# Patient Record
Sex: FEMALE | Race: WHITE | Employment: FULL TIME | URBAN - METROPOLITAN AREA
[De-identification: names, ages, dates, MRNs, and addresses within clinical notes are randomized per-mention and may not be internally consistent; named-entity substitution may affect disease eponyms.]

---

## 2022-04-04 ENCOUNTER — HOSPITAL ENCOUNTER (OUTPATIENT)
Age: 32
Discharge: HOME | End: 2022-04-06

## 2022-04-04 PROCEDURE — 88305 TISSUE EXAM BY PATHOLOGIST: CPT

## 2023-01-05 ENCOUNTER — OFFICE VISIT (OUTPATIENT)
Dept: OBGYN | Age: 33
End: 2023-01-05
Payer: COMMERCIAL

## 2023-01-05 VITALS
HEIGHT: 66 IN | DIASTOLIC BLOOD PRESSURE: 85 MMHG | SYSTOLIC BLOOD PRESSURE: 121 MMHG | HEART RATE: 97 BPM | BODY MASS INDEX: 23.18 KG/M2 | WEIGHT: 144.2 LBS

## 2023-01-05 DIAGNOSIS — N92.6 IRREGULAR MENSES: ICD-10-CM

## 2023-01-05 DIAGNOSIS — Z01.419 ENCOUNTER FOR GYNECOLOGICAL EXAMINATION: Primary | ICD-10-CM

## 2023-01-05 PROCEDURE — 99203 OFFICE O/P NEW LOW 30 MIN: CPT | Performed by: OBSTETRICS & GYNECOLOGY

## 2023-01-05 PROCEDURE — 99385 PREV VISIT NEW AGE 18-39: CPT | Performed by: OBSTETRICS & GYNECOLOGY

## 2023-01-05 NOTE — PROGRESS NOTES
Here today as new patient to establish care. LMP 12/26/22. Last pap was with Dr. Nelia Doherty in march of 2022. Denies any concerns at this time. Has never been pregnant. Pap smear obtained, labeled and sent to the lab. Directed to the lab to obtain ordered blood work. Discharge instructions have been discussed with the patient. Patient advised to call our office with any questions or concerns. Voiced understanding.

## 2023-01-05 NOTE — PROGRESS NOTES
HISTORY OF PRESENT ILLNESS:    33 y.o. female  No obstetric history on file. presents for her annual exam.     Patient's last menstrual period was 12/26/2022 (exact date). Periods are: irregular q 4-6 weeks  Contraception: none  Number of new sexual partners: 0  Most recent pap smear: 2022 normal  History of abnormal pap smears: none  Most recent mammogram: April 2021 fibrocystic disease  History of abnormal mammogram: none  Most recent colonoscopy:   Dexa scan:   HPV vaccination: no, recommended to patient  Changes to health since last visit: n/a  Complaints: irregular menses q 4-6 weeks. Stopped ocp in April 2022, would like to conceive      Past Medical History:   Past Medical History:   Diagnosis Date    Fibrocystic breast                                              OB History   No obstetric history on file. Past Surgical History:   Past Surgical History:   Procedure Laterality Date    BREAST BIOPSY          Allergies: Patient has no known allergies. Medications:   Current Outpatient Medications   Medication Sig Dispense Refill    Prenatal Vit-Fe Fumarate-FA (PRENATAL VITAMIN) 27-0.8 MG TABS Take 1 tablet by mouth daily 90 tablet 4     No current facility-administered medications for this visit.          Social History:   Social History     Tobacco Use    Smoking status: Never    Smokeless tobacco: Not on file   Substance Use Topics    Alcohol use: Not on file        Family History:   Family History   Problem Relation Age of Onset    Diabetes Mother     Diabetes Father     Pervasive Developmental Disorders  Maternal Aunt        REVIEW OF SYSTEMS:    Constitutional: negative  HEENT: negative  Breast: negative  Respiratory: negative  Cardiovascular: negative  Gastrointestinal: negative  Genitourinary:irregular menses  Integument: negative  Neurological: negative  Endocrine: negative          PHYSICAL EXAM  /85 (Position: Sitting)   Pulse 97   Ht 5' 6\" (1.676 m)   Wt 144 lb 3.2 oz (65.4 kg) LMP 12/26/2022 (Exact Date)   BMI 23.27 kg/m²       General appearance: alert, cooperative and in no acute distress. Head: NCAT   Neck: Neck supple, no mass  Breasts: nontender, 1 cm mobile mass at 10 o'clock, mobile, no dimpling, no discharge  Lungs: clear to auscultation bilaterally  Heart: regular rate and rhythm, no murmurs  Abdomen: soft, nontender, nondistended, no masses palpable, no rebound tenderness, no guarding or rigidity  Skin: No suspicious lesions  Neurologic: Alert and oriented, no focal deficits  Extremities: symmetrical without clubbing or cynosis  Psych: Alert, oriented, mood/affect full range, no acute distress    Pelvic Exam:   EXTERNAL GENITALIA: normal external genitalia, no external lesions  VAGINA: normal rugae, no discharge   CERVIX: normal appearing, no lesions, no bleeding  UTERUS: uterus is normal size, shape, consistency and nontender   ADNEXA: normal adnexa in size, nontender and no masses. RECTUM: no hemorrhoids or rectal masses. ASSESSMENT : Routine Annual Exam,    Diagnosis Orders   1. Encounter for gynecological examination  PAP SMEAR      2. Irregular menses  PAP SMEAR    Glucose, Random    Luteinizing Hormone    Follicle Stimulating Hormone    CBC    Prolactin    TSH    Testosterone, free, total    Insulin, total    DHEA-Sulfate    HCG Qualitative, Serum    Estradiol    US NON OB TRANSVAGINAL           PLAN:  Discussed ovulation test strips and PNV. Return in about 4 weeks (around 2/2/2023) for Review results. No orders of the defined types were placed in this encounter. Orders Placed This Encounter   Procedures    US NON OB TRANSVAGINAL     Standing Status:   Future     Standing Expiration Date:   1/5/2024    PAP SMEAR     Order Specific Question:   Collection Type     Answer:    Thin Prep     Order Specific Question:   Prior Abnormal Pap Test     Answer:   No     Order Specific Question:   Screening or Diagnostic     Answer:   Screening     Order Specific Question:   Additional STD Testing     Answer:   N/A     Order Specific Question:   HPV Requested?      Answer:   HPV Co-Test     Order Specific Question:   High Risk Patient     Answer:   N/A    Glucose, Random     Standing Status:   Future     Standing Expiration Date:   1/5/2024    Luteinizing Hormone     Standing Status:   Future     Standing Expiration Date:   7/7/6242    Follicle Stimulating Hormone     Standing Status:   Future     Standing Expiration Date:   1/5/2024    CBC     Standing Status:   Future     Standing Expiration Date:   1/5/2024    Prolactin     Standing Status:   Future     Standing Expiration Date:   1/5/2024    TSH     Standing Status:   Future     Standing Expiration Date:   1/5/2024    Testosterone, free, total     Standing Status:   Future     Standing Expiration Date:   1/5/2024    Insulin, total     Standing Status:   Future     Standing Expiration Date:   1/5/2024    DHEA-Sulfate     Standing Status:   Future     Standing Expiration Date:   1/5/2024    HCG Qualitative, Serum     Standing Status:   Future     Standing Expiration Date:   1/5/2024    Estradiol     Standing Status:   Future     Standing Expiration Date:   1/5/2024           Electronically signed by Amandeep Medel DO on 1/5/23

## 2023-01-06 LAB
HPV SAMPLE: NORMAL
SOURCE: NORMAL

## 2023-01-27 ENCOUNTER — HOSPITAL ENCOUNTER (OUTPATIENT)
Dept: ULTRASOUND IMAGING | Age: 33
Discharge: HOME | End: 2023-01-27
Payer: COMMERCIAL

## 2023-01-27 DIAGNOSIS — N92.6 IRREGULAR MENSES: ICD-10-CM

## 2023-01-27 PROCEDURE — 76830 TRANSVAGINAL US NON-OB: CPT

## 2023-02-02 ENCOUNTER — OFFICE VISIT (OUTPATIENT)
Dept: OBGYN | Age: 33
End: 2023-02-02
Payer: COMMERCIAL

## 2023-02-02 VITALS
SYSTOLIC BLOOD PRESSURE: 119 MMHG | BODY MASS INDEX: 24.1 KG/M2 | WEIGHT: 149.3 LBS | HEART RATE: 90 BPM | DIASTOLIC BLOOD PRESSURE: 76 MMHG

## 2023-02-02 DIAGNOSIS — N92.6 IRREGULAR MENSES: Primary | ICD-10-CM

## 2023-02-02 PROCEDURE — 99211 OFF/OP EST MAY X REQ PHY/QHP: CPT

## 2023-02-02 PROCEDURE — 99213 OFFICE O/P EST LOW 20 MIN: CPT | Performed by: OBSTETRICS & GYNECOLOGY

## 2023-02-02 NOTE — PROGRESS NOTES
Here today to review pap results and labs from 1/5/23. Directed to the lab to obtain ordered blood work. Discharge instructions have been discussed with the patient. Patient advised to call our office with any questions or concerns. Voiced understanding.

## 2023-02-02 NOTE — PROGRESS NOTES
HISTORY OF PRESENT ILLNESS:    Pt presents for follow up for irregular menses q 4-6 weeks. Stopped ocp in April 2022, would like to conceive. US was normal. Pt did not get labs drawn yet. Has been doing ovulation test strips, positive on day 24. Past Medical History:   Past Medical History:   Diagnosis Date    Fibroadenoma                                              OB History   No obstetric history on file. Past Surgical History:   Past Surgical History:   Procedure Laterality Date    BREAST BIOPSY          Allergies: Patient has no known allergies. Medications:   Current Outpatient Medications   Medication Sig Dispense Refill    Prenatal Vit-Fe Fumarate-FA (PRENATAL VITAMIN) 27-0.8 MG TABS Take 1 tablet by mouth daily 90 tablet 4     No current facility-administered medications for this visit. Social History:   Social History     Tobacco Use    Smoking status: Never    Smokeless tobacco: Not on file   Substance Use Topics    Alcohol use: Not on file        Family History:   Family History   Problem Relation Age of Onset    Diabetes Mother     Diabetes Father     Pervasive Developmental Disorders  Maternal Aunt        REVIEW OF SYSTEMS:    Constitutional: negative  HEENT: negative  Breast: negative  Respiratory: negative  Cardiovascular: negative  Gastrointestinal: negative  Genitourinary:negative  Integument: negative  Neurological: negative  Endocrine: negative          PHYSICAL EXAM  /76 (Position: Sitting)   Pulse 90   Wt 149 lb 4.8 oz (67.7 kg)   LMP 02/01/2023   BMI 24.10 kg/m²   Patient's last menstrual period was 02/01/2023. General appearance: alert, cooperative and in no acute distress. Head: NCAT   Psych: No acute distress, mood and affect full range  Neuro: Alert and oriented, no focal deficits          ASSESSMENT :   Diagnosis Orders   1.  Irregular menses  Miscellaneous Sendout           PLAN:  Discussed 80% of couples will conceive in first year of trying. Continue PNV. Get day 3 labs. F/u in May. Return in about 3 months (around 5/2/2023) for fertility consultation. No orders of the defined types were placed in this encounter. Orders Placed This Encounter   Procedures    Miscellaneous Sendout     Standing Status:   Future     Standing Expiration Date:   2/2/2024     Order Specific Question:   Specify Req.  Test (1 Test/Order)     Answer:   inhibin b           Electronically signed by Margaux Plascencia DO on 2/2/23

## 2023-02-03 ENCOUNTER — HOSPITAL ENCOUNTER (OUTPATIENT)
Age: 33
Discharge: HOME | End: 2023-02-03

## 2023-02-03 DIAGNOSIS — N92.6 IRREGULAR MENSES: ICD-10-CM

## 2023-02-03 LAB
ESTRADIOL LEVEL: 41.9 PG/ML
FOLLICLE STIMULATING HORMONE: 4.6 MIU/ML
GLUCOSE BLD-MCNC: 97 MG/DL (ref 74–99)
HCG QUALITATIVE: NEGATIVE
HCT VFR BLD CALC: 42.2 % (ref 34–48)
HEMOGLOBIN: 13.7 G/DL (ref 11.5–15.5)
LUTEINIZING HORMONE: 6.5 MIU/ML
MCH RBC QN AUTO: 28.8 PG (ref 26–35)
MCHC RBC AUTO-ENTMCNC: 32.5 % (ref 32–34.5)
MCV RBC AUTO: 88.8 FL (ref 80–99.9)
PDW BLD-RTO: 11.9 FL (ref 11.5–15)
PLATELET # BLD: 308 E9/L (ref 130–450)
PMV BLD AUTO: 11.2 FL (ref 7–12)
PROLACTIN: 13.9 NG/ML
RBC # BLD: 4.75 E12/L (ref 3.5–5.5)
TSH SERPL DL<=0.05 MIU/L-ACNC: 1.45 UIU/ML (ref 0.27–4.2)
WBC # BLD: 6.1 E9/L (ref 4.5–11.5)

## 2023-02-04 LAB
Lab: NORMAL
SEX HORMONE BINDING GLOBULIN: 53 NMOL/L (ref 30–135)
TESTOSTERONE FREE-NONMALE: 5.7 PG/ML (ref 1.3–9.2)
TESTOSTERONE TOTAL: 43 NG/DL (ref 20–70)
THIS TEST SENT TO: NORMAL

## 2023-02-06 LAB — INSULIN: 6 UIU/ML

## 2023-04-03 ENCOUNTER — PATIENT MESSAGE (OUTPATIENT)
Dept: OBGYN | Age: 33
End: 2023-04-03

## 2023-04-03 DIAGNOSIS — N91.2 AMENORRHEA: ICD-10-CM

## 2023-04-03 DIAGNOSIS — N91.2 AMENORRHEA: Primary | ICD-10-CM

## 2023-04-03 LAB — GONADOTROPIN, CHORIONIC (HCG) QUANT: <0.1 MIU/ML

## 2023-04-03 NOTE — TELEPHONE ENCOUNTER
From: Lyric Arvizu  To: Dr. Kayley Roper: 4/3/2023 8:29 AM EDT  Subject: Quick question    Good morning - reaching out because my period is really late but Radha been getting negative pregnancy tests. I am going on cycle day 61 and Im just a little concerned since Radha never had a cycle last this long. During this cycle I was sick for a week and also traveled for a week so I knew Id be late but this has been really late. My cycle is usually on the longer side and irregular but this one has been different. I wasnt sure when to reach out to see what we need to do or if theres anything that could be done? Thanks!

## 2023-04-03 NOTE — TELEPHONE ENCOUNTER
Please let patient know I have ordered a blood test to make sure she is not pregnant. If it is negative, we can give her something to start her cycle. Patient

## 2023-04-05 RX ORDER — MEDROXYPROGESTERONE ACETATE 10 MG/1
10 TABLET ORAL DAILY
Qty: 10 TABLET | Refills: 0 | Status: SHIPPED | OUTPATIENT
Start: 2023-04-05 | End: 2023-04-15

## 2023-06-07 ENCOUNTER — PATIENT MESSAGE (OUTPATIENT)
Dept: OBGYN | Age: 33
End: 2023-06-07

## 2023-06-07 DIAGNOSIS — N91.2 AMENORRHEA: Primary | ICD-10-CM

## 2023-06-08 DIAGNOSIS — N91.2 AMENORRHEA: ICD-10-CM

## 2023-06-08 NOTE — TELEPHONE ENCOUNTER
I've ordered another pregnancy  test for her. She should make a follow up appointment to discuss further management.

## 2023-06-08 NOTE — TELEPHONE ENCOUNTER
From: Gonzalo Arechiga  To: Dr. Lui Lappin2023 12:49 PM EDT  Subject: Negative Tests    Hello - Im reaching out again because Im on cycle day 63 with no period and negative pregnancy tests. I was wondering if you could order me a blood test again and if negative, send in a prescription to start my period. Thanks!      Myron Leon   [phone]

## 2023-06-09 LAB — GONADOTROPIN, CHORIONIC (HCG) QUANT: <0.1 MIU/ML

## 2023-07-03 SDOH — ECONOMIC STABILITY: FOOD INSECURITY: WITHIN THE PAST 12 MONTHS, THE FOOD YOU BOUGHT JUST DIDN'T LAST AND YOU DIDN'T HAVE MONEY TO GET MORE.: NEVER TRUE

## 2023-07-03 SDOH — ECONOMIC STABILITY: FOOD INSECURITY: WITHIN THE PAST 12 MONTHS, YOU WORRIED THAT YOUR FOOD WOULD RUN OUT BEFORE YOU GOT MONEY TO BUY MORE.: NEVER TRUE

## 2023-07-03 SDOH — ECONOMIC STABILITY: INCOME INSECURITY: HOW HARD IS IT FOR YOU TO PAY FOR THE VERY BASICS LIKE FOOD, HOUSING, MEDICAL CARE, AND HEATING?: NOT HARD AT ALL

## 2023-07-03 SDOH — ECONOMIC STABILITY: HOUSING INSECURITY
IN THE LAST 12 MONTHS, WAS THERE A TIME WHEN YOU DID NOT HAVE A STEADY PLACE TO SLEEP OR SLEPT IN A SHELTER (INCLUDING NOW)?: NO

## 2023-07-03 SDOH — ECONOMIC STABILITY: TRANSPORTATION INSECURITY
IN THE PAST 12 MONTHS, HAS LACK OF TRANSPORTATION KEPT YOU FROM MEETINGS, WORK, OR FROM GETTING THINGS NEEDED FOR DAILY LIVING?: NO

## 2023-07-03 ASSESSMENT — PATIENT HEALTH QUESTIONNAIRE - PHQ9
2. FEELING DOWN, DEPRESSED OR HOPELESS: NOT AT ALL
SUM OF ALL RESPONSES TO PHQ QUESTIONS 1-9: 0
SUM OF ALL RESPONSES TO PHQ9 QUESTIONS 1 & 2: 0
SUM OF ALL RESPONSES TO PHQ9 QUESTIONS 1 & 2: 0
1. LITTLE INTEREST OR PLEASURE IN DOING THINGS: 0
2. FEELING DOWN, DEPRESSED OR HOPELESS: 0
SUM OF ALL RESPONSES TO PHQ QUESTIONS 1-9: 0
1. LITTLE INTEREST OR PLEASURE IN DOING THINGS: NOT AT ALL
SUM OF ALL RESPONSES TO PHQ QUESTIONS 1-9: 0
SUM OF ALL RESPONSES TO PHQ QUESTIONS 1-9: 0

## 2023-07-06 ENCOUNTER — OFFICE VISIT (OUTPATIENT)
Dept: OBGYN | Age: 33
End: 2023-07-06
Payer: COMMERCIAL

## 2023-07-06 VITALS
WEIGHT: 146 LBS | HEART RATE: 91 BPM | DIASTOLIC BLOOD PRESSURE: 86 MMHG | SYSTOLIC BLOOD PRESSURE: 126 MMHG | BODY MASS INDEX: 23.57 KG/M2

## 2023-07-06 DIAGNOSIS — R19.7 DIARRHEA, UNSPECIFIED TYPE: ICD-10-CM

## 2023-07-06 DIAGNOSIS — K59.00 CONSTIPATION, UNSPECIFIED CONSTIPATION TYPE: ICD-10-CM

## 2023-07-06 DIAGNOSIS — N92.6 IRREGULAR MENSES: Primary | ICD-10-CM

## 2023-07-06 PROCEDURE — 99213 OFFICE O/P EST LOW 20 MIN: CPT | Performed by: OBSTETRICS & GYNECOLOGY

## 2023-07-06 PROCEDURE — 99212 OFFICE O/P EST SF 10 MIN: CPT | Performed by: OBSTETRICS & GYNECOLOGY

## 2023-07-06 RX ORDER — TERBINAFINE HYDROCHLORIDE 250 MG/1
250 TABLET ORAL DAILY
COMMUNITY
Start: 2023-06-14

## 2023-07-06 NOTE — PROGRESS NOTES
Here today to follow up on missed cycles. LMP 6/21/23. Discharge instructions have been discussed with the patient. Patient advised to call our office with any questions or concerns. Voiced understanding.

## 2023-07-06 NOTE — PROGRESS NOTES
HISTORY OF PRESENT ILLNESS:    Pt presents for follow up for irregular menses q 4-6 weeks. Stopped ocp in April 2022, would like to conceive. US was normal. Labs normal.    Has been doing ovulation test strips, positive on day 27 3 cycles ago. Last 2 cycles she did not get a positive. Started following basal body temp. Pt has had a lot of stress recently. C/o anxiety and nausea. C/o diarrhea and constipation. Has been having regular unprotected sex. No history of STDs. No substance use. No EtoH or tobacco. No caffeine.  has hypothyroidism. He is on synthroid. No other medical problems or medications. No surgeries before. Has never had children or tried to. No STD history. No tobacco or substance use. Social alcohol. No caffeine. Past Medical History:   Past Medical History:   Diagnosis Date    Fibroadenoma                                              OB History   No obstetric history on file. Past Surgical History:   Past Surgical History:   Procedure Laterality Date    BREAST BIOPSY          Allergies: Patient has no known allergies. Medications:   Current Outpatient Medications   Medication Sig Dispense Refill    terbinafine (LAMISIL) 250 MG tablet Take 1 tablet by mouth daily      ciclopirox (LOPROX) 0.77 % cream apply thin layer topically to feet and legs twice a day      Prenatal Vit-Fe Fumarate-FA (PRENATAL VITAMIN) 27-0.8 MG TABS Take 1 tablet by mouth daily 90 tablet 4    medroxyPROGESTERone (PROVERA) 10 MG tablet Take 1 tablet by mouth daily for 10 days 10 tablet 0     No current facility-administered medications for this visit.          Social History:   Social History     Tobacco Use    Smoking status: Never    Smokeless tobacco: Never   Substance Use Topics    Alcohol use: Never        Family History:   Family History   Problem Relation Age of Onset    Diabetes Mother     Diabetes Father     Pervasive Developmental Disorders  Maternal Aunt        REVIEW OF SYSTEMS:

## 2023-07-31 ENCOUNTER — TELEPHONE (OUTPATIENT)
Dept: ADMINISTRATIVE | Age: 33
End: 2023-07-31

## 2023-07-31 NOTE — TELEPHONE ENCOUNTER
Pt has positive pregnancy test, LMP 6-21-23, needs to see Dr. Andres Lebron before her appt on 10-12-23. Please call pt back. Thanks!

## 2023-08-01 ENCOUNTER — TELEPHONE (OUTPATIENT)
Dept: OBGYN | Age: 33
End: 2023-08-01

## 2023-08-01 NOTE — TELEPHONE ENCOUNTER
Patient called in to schedule her first prenatal visit with you in the [facility] office. Her LMP is 6/21/23 however I don't have anything available as a \"new ob\" until 11/2/23, she doesn't want to wait this long to be seen.   Please advise

## 2023-08-31 ENCOUNTER — INITIAL PRENATAL (OUTPATIENT)
Dept: OBGYN | Age: 33
End: 2023-08-31
Payer: COMMERCIAL

## 2023-08-31 VITALS
BODY MASS INDEX: 23.4 KG/M2 | SYSTOLIC BLOOD PRESSURE: 121 MMHG | DIASTOLIC BLOOD PRESSURE: 86 MMHG | HEART RATE: 118 BPM | WEIGHT: 145 LBS

## 2023-08-31 DIAGNOSIS — Z34.91 ENCOUNTER FOR SUPERVISION OF NORMAL PREGNANCY IN FIRST TRIMESTER, UNSPECIFIED GRAVIDITY: ICD-10-CM

## 2023-08-31 DIAGNOSIS — Z3A.10 10 WEEKS GESTATION OF PREGNANCY: ICD-10-CM

## 2023-08-31 DIAGNOSIS — Z34.91 ENCOUNTER FOR SUPERVISION OF NORMAL PREGNANCY IN FIRST TRIMESTER, UNSPECIFIED GRAVIDITY: Primary | ICD-10-CM

## 2023-08-31 DIAGNOSIS — N91.2 AMENORRHEA: ICD-10-CM

## 2023-08-31 LAB
CONTROL: NORMAL
GLUCOSE URINE, POC: NEGATIVE
PREGNANCY TEST URINE, POC: POSITIVE
PROTEIN UA: NEGATIVE

## 2023-08-31 PROCEDURE — 81025 URINE PREGNANCY TEST: CPT | Performed by: OBSTETRICS & GYNECOLOGY

## 2023-08-31 PROCEDURE — 99213 OFFICE O/P EST LOW 20 MIN: CPT | Performed by: OBSTETRICS & GYNECOLOGY

## 2023-08-31 PROCEDURE — 81002 URINALYSIS NONAUTO W/O SCOPE: CPT | Performed by: OBSTETRICS & GYNECOLOGY

## 2023-08-31 NOTE — PROGRESS NOTES
Patient alert and pleasant with no complaints. Here today for initial prenatal visit. Positive pregnancy test here today and has had HCG levels done. Urine for glucose and protein obtained with negative results. Urine and culture obtained, labeled and sent to the lab. Directed to the lab to obtain ordered blood work. Discharge instructions have been discussed with the patient. Patient advised to call our office with any questions or concerns. Voiced understanding.

## 2023-08-31 NOTE — PROGRESS NOTES
SUBJECTIVE:   29 y.o.  female here for new OB appointment. Pt denies any VB and is tolerating po daily. Pt taking PNV. Pap normal in January. Cultures done today. Prenatal labs and mfm consult ordered. Pt would like to think about genetic testing. Pt has anxiety, states it controlled with coping mechanisms and exercise. US tomorrow. F/u 4 wks.      OB History    Para Term  AB Living   1             SAB IAB Ectopic Molar Multiple Live Births                    # Outcome Date GA Lbr Ab/2nd Weight Sex Delivery Anes PTL Lv   1 Current                Past Medical History:   Diagnosis Date    Anxiety     Fibroadenoma         Past Surgical History:   Procedure Laterality Date    BREAST BIOPSY          Family History   Problem Relation Age of Onset    Diabetes Mother     Diabetes Father     Pervasive Developmental Disorders  Maternal Aunt         developmental disability    Other Maternal Cousin         developmental disability        Social History       Tobacco History       Smoking Status  Never      Smokeless Tobacco Use  Never              Alcohol History       Alcohol Use Status  Never              Drug Use       Drug Use Status  Never              Sexual Activity       Sexually Active  Yes Partners  Male                      Current Outpatient Medications:     Prenatal Vit-Fe Fumarate-FA (PRENATAL VITAMIN) 27-0.8 MG TABS, Take 1 tablet by mouth daily, Disp: 90 tablet, Rfl: 4    terbinafine (LAMISIL) 250 MG tablet, Take 1 tablet by mouth daily (Patient not taking: Reported on 2023), Disp: , Rfl:     ciclopirox (LOPROX) 0.77 % cream, apply thin layer topically to feet and legs twice a day (Patient not taking: Reported on 2023), Disp: , Rfl:     medroxyPROGESTERone (PROVERA) 10 MG tablet, Take 1 tablet by mouth daily for 10 days, Disp: 10 tablet, Rfl: 0     No Known Allergies     Review of Systems:  Constitutional: negative  HEENT: negative  Breast: negative  Respiratory:

## 2023-09-01 ENCOUNTER — ANCILLARY PROCEDURE (OUTPATIENT)
Dept: OBGYN CLINIC | Age: 33
End: 2023-09-01
Payer: COMMERCIAL

## 2023-09-01 ENCOUNTER — INITIAL PRENATAL (OUTPATIENT)
Dept: OBGYN CLINIC | Age: 33
End: 2023-09-01

## 2023-09-01 DIAGNOSIS — Z34.01 PRIMIGRAVIDA IN FIRST TRIMESTER: Primary | ICD-10-CM

## 2023-09-01 DIAGNOSIS — N91.2 AMENORRHEA: ICD-10-CM

## 2023-09-01 DIAGNOSIS — Z3A.10 10 WEEKS GESTATION OF PREGNANCY: ICD-10-CM

## 2023-09-01 LAB
AMPHETAMINE SCREEN URINE: NEGATIVE
BARBITURATE SCREEN URINE: NEGATIVE
BENZODIAZEPINE SCREEN, URINE: NEGATIVE
BUPRENORPHINE URINE: NEGATIVE
CANNABINOID SCREEN URINE: NEGATIVE
COCAINE METABOLITE, URINE: NEGATIVE
FENTANYL URINE: NEGATIVE
METHADONE SCREEN, URINE: NEGATIVE
OPIATES, URINE: NEGATIVE
OXYCODONE SCREEN URINE: NEGATIVE
PHENCYCLIDINE, URINE: NEGATIVE
TEST INFORMATION: NORMAL

## 2023-09-01 PROCEDURE — 76801 OB US < 14 WKS SINGLE FETUS: CPT | Performed by: OBSTETRICS & GYNECOLOGY

## 2023-09-01 NOTE — PROGRESS NOTES
[address] [facility]  [address]  [facility] [address]  Dept: [phone]  Loc: [phone]     2023    RE:  Neredia Ruelas     : 1990   AGE: 29 y.o. Dear Dr. Gato Barnes,    Thank you for allowing me to see Nereida Ruelas. As I'm sure you will recall, Nereida Ruelas is a 29 y.o. [id number]'s last menstrual period was 2023. Estimated Date of Delivery: 24 at 9w0d seen in our office today for the following:    REASON FOR VISIT: Viability    Patient Active Problem List    Diagnosis Date Noted    10 weeks gestation of pregnancy 2023    Primigravida in first trimester 2023        PAST HISTORY:  OB History    Para Term  AB Living   1             SAB IAB Ectopic Molar Multiple Live Births                    # Outcome Date GA Lbr Ab/2nd Weight Sex Delivery Anes PTL Lv   1 Current                   MEDICAL:  Past Medical History:   Diagnosis Date    Anxiety     Fibroadenoma         SURGICAL:  Past Surgical History:   Procedure Laterality Date    BREAST BIOPSY         ALLERGIES:    No Known Allergies      MEDICATIONS:    Current Outpatient Medications   Medication Sig Dispense Refill    terbinafine (LAMISIL) 250 MG tablet Take 1 tablet by mouth daily (Patient not taking: Reported on 2023)      ciclopirox (LOPROX) 0.77 % cream apply thin layer topically to feet and legs twice a day (Patient not taking: Reported on 2023)      medroxyPROGESTERone (PROVERA) 10 MG tablet Take 1 tablet by mouth daily for 10 days 10 tablet 0    Prenatal Vit-Fe Fumarate-FA (PRENATAL VITAMIN) 27-0.8 MG TABS Take 1 tablet by mouth daily 90 tablet 4     No current facility-administered medications for this visit.         Social History     Socioeconomic History    Marital status:    Tobacco Use    Smoking status: Never    Smokeless tobacco: Never   Vaping Use    Vaping Use: Never used

## 2023-09-05 ENCOUNTER — TELEPHONE (OUTPATIENT)
Dept: OBGYN | Age: 33
End: 2023-09-05

## 2023-09-05 DIAGNOSIS — Z3A.10 10 WEEKS GESTATION OF PREGNANCY: ICD-10-CM

## 2023-09-05 DIAGNOSIS — Z34.01 ENCOUNTER FOR SUPERVISION OF NORMAL FIRST PREGNANCY IN FIRST TRIMESTER: Primary | ICD-10-CM

## 2023-09-05 LAB
C TRACH DNA GENITAL QL NAA+PROBE: NEGATIVE
N. GONORRHOEAE DNA: NEGATIVE

## 2023-09-05 NOTE — TELEPHONE ENCOUNTER
Patient called and said she decided she wants to have the genetic testing done. Advised she will have to wait another week and she will need to come up to the office for a kit before proceeding. She verbalized understanding and said she is going to wait to get the other blood work done at the same time.

## 2023-09-08 ENCOUNTER — HOSPITAL ENCOUNTER (OUTPATIENT)
Age: 33
Discharge: HOME | End: 2023-09-08
Payer: COMMERCIAL

## 2023-09-08 DIAGNOSIS — Z3A.10 10 WEEKS GESTATION OF PREGNANCY: ICD-10-CM

## 2023-09-08 DIAGNOSIS — Z34.91 ENCOUNTER FOR SUPERVISION OF NORMAL PREGNANCY IN FIRST TRIMESTER, UNSPECIFIED GRAVIDITY: ICD-10-CM

## 2023-09-08 LAB
ABO + RH BLD: NORMAL
BLOOD BANK SAMPLE EXPIRATION: NORMAL
BLOOD GROUP ANTIBODIES SERPL: NEGATIVE

## 2023-09-08 PROCEDURE — 86901 BLOOD TYPING SEROLOGIC RH(D): CPT

## 2023-09-08 PROCEDURE — 87389 HIV-1 AG W/HIV-1&-2 AB AG IA: CPT

## 2023-09-08 PROCEDURE — 36415 COLL VENOUS BLD VENIPUNCTURE: CPT

## 2023-09-08 PROCEDURE — 87340 HEPATITIS B SURFACE AG IA: CPT

## 2023-09-08 PROCEDURE — 86900 BLOOD TYPING SEROLOGIC ABO: CPT

## 2023-09-08 PROCEDURE — 86787 VARICELLA-ZOSTER ANTIBODY: CPT

## 2023-09-08 PROCEDURE — 86850 RBC ANTIBODY SCREEN: CPT

## 2023-09-08 PROCEDURE — 86803 HEPATITIS C AB TEST: CPT

## 2023-09-08 PROCEDURE — 86592 SYPHILIS TEST NON-TREP QUAL: CPT

## 2023-09-11 LAB
HBV SURFACE AG SERPL QL IA: NONREACTIVE
HCV AB SERPL QL IA: NONREACTIVE
HIV 1+2 AB+HIV1 P24 AG SERPL QL IA: NONREACTIVE
RPR SER QL: NONREACTIVE

## 2023-09-12 LAB — VZV IGG SER QL IA: ABNORMAL

## 2023-09-28 ENCOUNTER — ROUTINE PRENATAL (OUTPATIENT)
Dept: OBGYN | Age: 33
End: 2023-09-28
Payer: COMMERCIAL

## 2023-09-28 VITALS
SYSTOLIC BLOOD PRESSURE: 121 MMHG | DIASTOLIC BLOOD PRESSURE: 59 MMHG | BODY MASS INDEX: 23.45 KG/M2 | WEIGHT: 145.3 LBS | HEART RATE: 112 BPM

## 2023-09-28 DIAGNOSIS — Z34.01 ENCOUNTER FOR SUPERVISION OF NORMAL FIRST PREGNANCY IN FIRST TRIMESTER: Primary | ICD-10-CM

## 2023-09-28 DIAGNOSIS — Z3A.12 12 WEEKS GESTATION OF PREGNANCY: ICD-10-CM

## 2023-09-28 LAB
GLUCOSE URINE, POC: NEGATIVE
PROTEIN UA: NEGATIVE

## 2023-09-28 PROCEDURE — 81002 URINALYSIS NONAUTO W/O SCOPE: CPT | Performed by: OBSTETRICS & GYNECOLOGY

## 2023-09-28 PROCEDURE — 99213 OFFICE O/P EST LOW 20 MIN: CPT | Performed by: OBSTETRICS & GYNECOLOGY

## 2023-10-20 ENCOUNTER — HOSPITAL ENCOUNTER (OUTPATIENT)
Age: 33
Discharge: HOME | End: 2023-10-20
Payer: COMMERCIAL

## 2023-10-20 DIAGNOSIS — Z34.01 ENCOUNTER FOR SUPERVISION OF NORMAL FIRST PREGNANCY IN FIRST TRIMESTER: ICD-10-CM

## 2023-10-20 DIAGNOSIS — Z3A.12 12 WEEKS GESTATION OF PREGNANCY: ICD-10-CM

## 2023-10-20 LAB
ERYTHROCYTE [DISTWIDTH] IN BLOOD BY AUTOMATED COUNT: 12.7 % (ref 11.5–15)
HCT VFR BLD AUTO: 35.4 % (ref 34–48)
HGB BLD-MCNC: 11.9 G/DL (ref 11.5–15.5)
MCH RBC QN AUTO: 29.5 PG (ref 26–35)
MCHC RBC AUTO-ENTMCNC: 33.6 G/DL (ref 32–34.5)
MCV RBC AUTO: 87.6 FL (ref 80–99.9)
PLATELET # BLD AUTO: 277 K/UL (ref 130–450)
PMV BLD AUTO: 10.9 FL (ref 7–12)
RBC # BLD AUTO: 4.04 M/UL (ref 3.5–5.5)
WBC OTHER # BLD: 10.3 K/UL (ref 4.5–11.5)

## 2023-10-20 PROCEDURE — 82105 ALPHA-FETOPROTEIN SERUM: CPT

## 2023-10-20 PROCEDURE — 81329 SMN1 GENE DOS/DELETION ALYS: CPT

## 2023-10-20 PROCEDURE — 85027 COMPLETE CBC AUTOMATED: CPT

## 2023-10-20 PROCEDURE — 86762 RUBELLA ANTIBODY: CPT

## 2023-10-20 PROCEDURE — 81220 CFTR GENE COM VARIANTS: CPT

## 2023-10-23 LAB — RUBV IGG SERPL QL IA: NORMAL IU/ML

## 2023-10-25 LAB
AFP INTERPRETATION: NORMAL
AFP MOM: 0.68
AFP SPECIMEN: NORMAL
AFP: 24 NG/ML
DATE OF BIRTH: NORMAL
DATING METHOD: NORMAL
DETERMINED BY: NORMAL
DIABETIC: NORMAL
DONOR EGG?: NORMAL
DUE DATE: NORMAL
ESTIMATED DUE DATE: NORMAL
FAMILY HISTORY NTD: NORMAL
GESTATIONAL AGE: NORMAL
IN VITRO FERTILIZATION: NORMAL
INSULIN REQ DIABETES: NO
LAST MENSTRUAL PERIOD: NORMAL
MATERNAL AGE AT EDD: 32.9 YR
MATERNAL WEIGHT: NORMAL
MONOCHORIONIC TWINS: NORMAL
NUMBER OF FETUSES: NORMAL
PATIENT WEIGHT UNITS: NORMAL
PATIENT WEIGHT: NORMAL
RACE (MATERNAL): NORMAL
RACE: NORMAL
REPEAT SPECIMEN?: NORMAL
SMOKING: NO
SMOKING: NORMAL
VALPROIC/CARBAMAZEP: NORMAL
ZZ NTE CLEAN UP: HISTORY: NO

## 2023-10-26 ENCOUNTER — ROUTINE PRENATAL (OUTPATIENT)
Dept: OBGYN | Age: 33
End: 2023-10-26
Payer: COMMERCIAL

## 2023-10-26 VITALS
SYSTOLIC BLOOD PRESSURE: 116 MMHG | DIASTOLIC BLOOD PRESSURE: 80 MMHG | BODY MASS INDEX: 23.92 KG/M2 | HEART RATE: 98 BPM | WEIGHT: 148.2 LBS | OXYGEN SATURATION: 98 %

## 2023-10-26 DIAGNOSIS — Z34.02 ENCOUNTER FOR SUPERVISION OF NORMAL FIRST PREGNANCY IN SECOND TRIMESTER: Primary | ICD-10-CM

## 2023-10-26 DIAGNOSIS — Z34.92 ENCOUNTER FOR SUPERVISION OF NORMAL PREGNANCY IN SECOND TRIMESTER, UNSPECIFIED GRAVIDITY: ICD-10-CM

## 2023-10-26 DIAGNOSIS — Z3A.16 16 WEEKS GESTATION OF PREGNANCY: ICD-10-CM

## 2023-10-26 PROCEDURE — 99213 OFFICE O/P EST LOW 20 MIN: CPT | Performed by: OBSTETRICS & GYNECOLOGY

## 2023-10-26 RX ORDER — LANOLIN ALCOHOL/MO/W.PET/CERES
25 CREAM (GRAM) TOPICAL
Qty: 120 TABLET | Refills: 3 | Status: SHIPPED | OUTPATIENT
Start: 2023-10-26

## 2023-10-27 LAB
GEN STRUCT VAR COPY NUM: NORMAL
SEND OUT REPORT: NORMAL
SMN1 GENE MUT ANL BLD/T: NORMAL
SMN1+SMN2 GENE MUT ANL BLD/T: NORMAL
SMN2 GENE MUT ANL BLD/T: NORMAL
SPECIMEN SOURCE: NORMAL
SYMPTOM: NORMAL
TEST NAME: NORMAL

## 2023-10-28 LAB
CFTR ALLELE 1 BLD/T QL: NEGATIVE
CFTR ALLELE 2 BLD/T QL: NEGATIVE
CFTR MUT ANL BLD/T: NORMAL
CFTR MUT ANL BLD/T: NORMAL

## 2023-11-16 ENCOUNTER — ANCILLARY PROCEDURE (OUTPATIENT)
Dept: OBGYN CLINIC | Age: 33
End: 2023-11-16
Payer: COMMERCIAL

## 2023-11-16 ENCOUNTER — INITIAL PRENATAL (OUTPATIENT)
Dept: OBGYN CLINIC | Age: 33
End: 2023-11-16
Payer: COMMERCIAL

## 2023-11-16 VITALS
HEART RATE: 105 BPM | BODY MASS INDEX: 24.57 KG/M2 | DIASTOLIC BLOOD PRESSURE: 81 MMHG | WEIGHT: 152.25 LBS | SYSTOLIC BLOOD PRESSURE: 116 MMHG

## 2023-11-16 DIAGNOSIS — Z3A.19 19 WEEKS GESTATION OF PREGNANCY: Primary | ICD-10-CM

## 2023-11-16 PROBLEM — Z34.02 PRIMIGRAVIDA IN SECOND TRIMESTER: Status: ACTIVE | Noted: 2023-09-01

## 2023-11-16 LAB
GLUCOSE URINE, POC: NEGATIVE
PROTEIN UA: NEGATIVE

## 2023-11-16 PROCEDURE — 81002 URINALYSIS NONAUTO W/O SCOPE: CPT | Performed by: OBSTETRICS & GYNECOLOGY

## 2023-11-16 PROCEDURE — 99999 PR OFFICE/OUTPT VISIT,PROCEDURE ONLY: CPT | Performed by: OBSTETRICS & GYNECOLOGY

## 2023-11-16 PROCEDURE — 99203 OFFICE O/P NEW LOW 30 MIN: CPT | Performed by: OBSTETRICS & GYNECOLOGY

## 2023-11-16 PROCEDURE — 76811 OB US DETAILED SNGL FETUS: CPT | Performed by: OBSTETRICS & GYNECOLOGY

## 2023-11-16 NOTE — PATIENT INSTRUCTIONS
Please arrive for your scheduled appointment at least 15 minutes early with your actual insurance card+ a photo ID. Also if you need any refills ordered or have questions, it may take up 48 hours to reply. Please allow ample time for your refills. Call me when you use last refill. Thank you for your cooperation. Call your primary obstetrician with bleeding, leaking of fluid, abdominal tenderness, headache, blurry vision, epigastric pain and increased urinary frequency. If you are experiencing an emergency and need immediate help, call 911 or go to go emergency room or labor and delivery. if you are sick, not feeling well or have an infectious process going on please reschedule your appointment by calling [phone]. Also if any family members are not feeling well, please do not bring them to your appointment. We appreciate your cooperation. We are doing this in order to protect our pregnant mothers+ their babies. if you are sick, not feeling well or have an infectious process going on please reschedule your appointment by calling [phone]. Also if any family members are not feeling well, please do not bring them to your appointment. We appreciate your cooperation. We are doing this in order to protect our pregnant mothers+ their babies.

## 2023-11-16 NOTE — PROGRESS NOTES
[address] [facility]  [address]  [place] [address]  Dept: [phone]  Loc: [phone]     2023    RE:  Liseth Ramirez     : 1990   AGE: 29 y.o. Dear Dr. Blanco Cabrera,    Thank you for allowing me to see Liseth Ramirez. As I'm sure you will recall, Liseth Ramirez is a 29 y.o. [id number]'s last menstrual period was 2023. Estimated Date of Delivery: 24 at 19w6d seen in our office today for the following:    REASON FOR VISIT: Level II    Patient Active Problem List    Diagnosis Date Noted    19 weeks gestation of pregnancy 2023    Primigravida in second trimester 2023        PAST HISTORY:  OB History    Para Term  AB Living   1             SAB IAB Ectopic Molar Multiple Live Births                    # Outcome Date GA Lbr Ab/2nd Weight Sex Delivery Anes PTL Lv   1 Current                   MEDICAL:  Past Medical History:   Diagnosis Date    Anxiety     Fibroadenoma         SURGICAL:  Past Surgical History:   Procedure Laterality Date    BREAST BIOPSY         ALLERGIES:    No Known Allergies      MEDICATIONS:    Current Outpatient Medications   Medication Sig Dispense Refill    doxyLAMINE succinate (UNISOM SLEEPTABS) 25 MG tablet Take 0.5 tablets by mouth every 6 hours as needed (nausea) 120 tablet 0    vitamin B-6 (PYRIDOXINE) 50 MG tablet Take 0.5 tablets by mouth every 4-6 hours as needed (nausea) 120 tablet 3    Prenatal Vit-Fe Fumarate-FA (PRENATAL VITAMIN) 27-0.8 MG TABS Take 1 tablet by mouth daily 90 tablet 4    terbinafine (LAMISIL) 250 MG tablet Take 1 tablet by mouth daily (Patient not taking: Reported on 2023)      ciclopirox (LOPROX) 0.77 % cream apply thin layer topically to feet and legs twice a day (Patient not taking: Reported on 2023)       No current facility-administered medications for this visit.         Social History     Socioeconomic

## 2023-11-30 ENCOUNTER — ROUTINE PRENATAL (OUTPATIENT)
Dept: OBGYN | Age: 33
End: 2023-11-30
Payer: COMMERCIAL

## 2023-11-30 VITALS
SYSTOLIC BLOOD PRESSURE: 122 MMHG | HEART RATE: 92 BPM | BODY MASS INDEX: 25.23 KG/M2 | WEIGHT: 156.3 LBS | DIASTOLIC BLOOD PRESSURE: 81 MMHG

## 2023-11-30 DIAGNOSIS — Z3A.21 21 WEEKS GESTATION OF PREGNANCY: ICD-10-CM

## 2023-11-30 DIAGNOSIS — Z34.92 ENCOUNTER FOR SUPERVISION OF NORMAL PREGNANCY IN SECOND TRIMESTER, UNSPECIFIED GRAVIDITY: Primary | ICD-10-CM

## 2023-11-30 LAB
GLUCOSE URINE, POC: NEGATIVE
PROTEIN UA: NEGATIVE

## 2023-11-30 PROCEDURE — 0502F SUBSEQUENT PRENATAL CARE: CPT | Performed by: OBSTETRICS & GYNECOLOGY

## 2023-11-30 PROCEDURE — 99213 OFFICE O/P EST LOW 20 MIN: CPT | Performed by: OBSTETRICS & GYNECOLOGY

## 2023-11-30 PROCEDURE — 81002 URINALYSIS NONAUTO W/O SCOPE: CPT | Performed by: OBSTETRICS & GYNECOLOGY

## 2023-11-30 NOTE — PROGRESS NOTES
SUBJECTIVE:   29 y.o.  female here for routine OB appointment. +FM. No lof, vb, ctx. SMA, CF results negative. Nausea resolved. Declines flu and covid vaccine. Anatomy done with mfm. F/u 4 wks.    .  OB History    Para Term  AB Living   1             SAB IAB Ectopic Molar Multiple Live Births                    # Outcome Date GA Lbr Ab/2nd Weight Sex Delivery Anes PTL Lv   1 Current                Past Medical History:   Diagnosis Date    Anxiety     Fibroadenoma         Past Surgical History:   Procedure Laterality Date    BREAST BIOPSY          Family History   Problem Relation Age of Onset    Diabetes Mother     Diabetes Father     Pervasive Developmental Disorders  Maternal Aunt         developmental disability    Other Maternal Cousin         developmental disability        Social History       Tobacco History       Smoking Status  Never      Smokeless Tobacco Use  Never              Alcohol History       Alcohol Use Status  Never              Drug Use       Drug Use Status  Never              Sexual Activity       Sexually Active  Yes Partners  Male                      Current Outpatient Medications:     doxyLAMINE succinate (UNISOM SLEEPTABS) 25 MG tablet, Take 0.5 tablets by mouth every 6 hours as needed (nausea), Disp: 120 tablet, Rfl: 0    vitamin B-6 (PYRIDOXINE) 50 MG tablet, Take 0.5 tablets by mouth every 4-6 hours as needed (nausea), Disp: 120 tablet, Rfl: 3    Prenatal Vit-Fe Fumarate-FA (PRENATAL VITAMIN) 27-0.8 MG TABS, Take 1 tablet by mouth daily, Disp: 90 tablet, Rfl: 4    terbinafine (LAMISIL) 250 MG tablet, Take 1 tablet by mouth daily (Patient not taking: Reported on 2023), Disp: , Rfl:     ciclopirox (LOPROX) 0.77 % cream, apply thin layer topically to feet and legs twice a day (Patient not taking: Reported on 2023), Disp: , Rfl:      No Known Allergies     OBJECTIVE:   /81   Pulse 92   Wt 70.9 kg (156 lb 4.8 oz)   LMP 2023   BMI 25.23

## 2023-11-30 NOTE — PROGRESS NOTES
Patient alert and pleasant today with no complaints. Here today for prenatal visit. Urine for protein and glucose obtained with negative results. Fetal heart tones obtained without difficulty. Discharge instructions given and patient directed to call the office with any questions or concerns and verbalized understanding.

## 2023-12-28 ENCOUNTER — ROUTINE PRENATAL (OUTPATIENT)
Dept: OBGYN | Age: 33
End: 2023-12-28
Payer: COMMERCIAL

## 2023-12-28 VITALS
DIASTOLIC BLOOD PRESSURE: 84 MMHG | WEIGHT: 161 LBS | HEART RATE: 100 BPM | SYSTOLIC BLOOD PRESSURE: 135 MMHG | BODY MASS INDEX: 25.99 KG/M2

## 2023-12-28 DIAGNOSIS — Z34.92 ENCOUNTER FOR SUPERVISION OF NORMAL PREGNANCY IN SECOND TRIMESTER, UNSPECIFIED GRAVIDITY: Primary | ICD-10-CM

## 2023-12-28 DIAGNOSIS — Z3A.25 25 WEEKS GESTATION OF PREGNANCY: ICD-10-CM

## 2023-12-28 LAB
GLUCOSE URINE, POC: NEGATIVE
PROTEIN UA: NEGATIVE

## 2023-12-28 PROCEDURE — 81002 URINALYSIS NONAUTO W/O SCOPE: CPT | Performed by: OBSTETRICS & GYNECOLOGY

## 2023-12-28 PROCEDURE — 99213 OFFICE O/P EST LOW 20 MIN: CPT | Performed by: OBSTETRICS & GYNECOLOGY

## 2023-12-28 PROCEDURE — 0502F SUBSEQUENT PRENATAL CARE: CPT | Performed by: OBSTETRICS & GYNECOLOGY

## 2023-12-28 NOTE — PROGRESS NOTES
Patient alert and pleasant today with no complaints. Here today for prenatal visit. Urine for glucose and protein obtained with negative results. Fetal heart tones obtained without difficulty. Discharge instructions given and patient directed to call the office with any questions or concerns and verbalized understanding.

## 2024-01-23 ENCOUNTER — HOSPITAL ENCOUNTER (OUTPATIENT)
Age: 34
Discharge: HOME | End: 2024-01-23
Payer: COMMERCIAL

## 2024-01-23 DIAGNOSIS — Z34.92 ENCOUNTER FOR SUPERVISION OF NORMAL PREGNANCY IN SECOND TRIMESTER, UNSPECIFIED GRAVIDITY: ICD-10-CM

## 2024-01-23 DIAGNOSIS — Z3A.25 25 WEEKS GESTATION OF PREGNANCY: ICD-10-CM

## 2024-01-23 LAB
ABO + RH BLD: NORMAL
AMOUNT GLUCOSE GIVEN: 50 G
BLOOD BANK SAMPLE EXPIRATION: NORMAL
BLOOD GROUP ANTIBODIES SERPL: NEGATIVE
COLLECT TIME, 1HR GLUCOSE: 1510
ERYTHROCYTE [DISTWIDTH] IN BLOOD BY AUTOMATED COUNT: 12.6 % (ref 11.5–15)
GLUCOSE 3H P 100 G GLC PO SERPL-MCNC: 184 MG/DL
HCT VFR BLD AUTO: 30 % (ref 34–48)
HGB BLD-MCNC: 9.6 G/DL (ref 11.5–15.5)
MCH RBC QN AUTO: 28.6 PG (ref 26–35)
MCHC RBC AUTO-ENTMCNC: 32 G/DL (ref 32–34.5)
MCV RBC AUTO: 89.3 FL (ref 80–99.9)
PLATELET # BLD AUTO: 193 K/UL (ref 130–450)
PMV BLD AUTO: 11.2 FL (ref 7–12)
RBC # BLD AUTO: 3.36 M/UL (ref 3.5–5.5)
WBC OTHER # BLD: 11.5 K/UL (ref 4.5–11.5)

## 2024-01-23 PROCEDURE — 36415 COLL VENOUS BLD VENIPUNCTURE: CPT

## 2024-01-23 PROCEDURE — 86901 BLOOD TYPING SEROLOGIC RH(D): CPT

## 2024-01-23 PROCEDURE — 85027 COMPLETE CBC AUTOMATED: CPT

## 2024-01-23 PROCEDURE — 82947 ASSAY GLUCOSE BLOOD QUANT: CPT

## 2024-01-23 PROCEDURE — 86592 SYPHILIS TEST NON-TREP QUAL: CPT

## 2024-01-23 PROCEDURE — 86900 BLOOD TYPING SEROLOGIC ABO: CPT

## 2024-01-23 PROCEDURE — 86850 RBC ANTIBODY SCREEN: CPT

## 2024-01-24 LAB — RPR SER QL: NONREACTIVE

## 2024-01-25 ENCOUNTER — ROUTINE PRENATAL (OUTPATIENT)
Dept: OBGYN | Age: 34
End: 2024-01-25
Payer: COMMERCIAL

## 2024-01-25 VITALS
BODY MASS INDEX: 26.9 KG/M2 | DIASTOLIC BLOOD PRESSURE: 80 MMHG | SYSTOLIC BLOOD PRESSURE: 121 MMHG | WEIGHT: 167.4 LBS | HEART RATE: 106 BPM | HEIGHT: 66 IN

## 2024-01-25 DIAGNOSIS — O24.419 GESTATIONAL DIABETES MELLITUS (GDM) IN THIRD TRIMESTER, GESTATIONAL DIABETES METHOD OF CONTROL UNSPECIFIED: ICD-10-CM

## 2024-01-25 DIAGNOSIS — Z34.03 ENCOUNTER FOR SUPERVISION OF NORMAL FIRST PREGNANCY IN THIRD TRIMESTER: Primary | ICD-10-CM

## 2024-01-25 DIAGNOSIS — Z3A.29 29 WEEKS GESTATION OF PREGNANCY: ICD-10-CM

## 2024-01-25 LAB
GLUCOSE URINE, POC: NORMAL
PROTEIN UA: NEGATIVE

## 2024-01-25 PROCEDURE — 81002 URINALYSIS NONAUTO W/O SCOPE: CPT | Performed by: OBSTETRICS & GYNECOLOGY

## 2024-01-25 PROCEDURE — 99213 OFFICE O/P EST LOW 20 MIN: CPT | Performed by: OBSTETRICS & GYNECOLOGY

## 2024-01-25 RX ORDER — BLOOD-GLUCOSE METER
1 KIT MISCELLANEOUS
Qty: 1 KIT | Refills: 0 | Status: SHIPPED | OUTPATIENT
Start: 2024-01-25

## 2024-01-25 RX ORDER — FERROUS SULFATE 325(65) MG
325 TABLET ORAL 2 TIMES DAILY
Qty: 180 TABLET | Refills: 1 | Status: SHIPPED | OUTPATIENT
Start: 2024-01-25

## 2024-01-25 RX ORDER — GLUCOSAMINE HCL/CHONDROITIN SU 500-400 MG
CAPSULE ORAL
Qty: 200 STRIP | Refills: 5 | Status: SHIPPED | OUTPATIENT
Start: 2024-01-25

## 2024-01-25 RX ORDER — LANCETS 30 GAUGE
1 EACH MISCELLANEOUS 4 TIMES DAILY
Qty: 200 EACH | Refills: 5 | Status: SHIPPED | OUTPATIENT
Start: 2024-01-25

## 2024-01-25 ASSESSMENT — PATIENT HEALTH QUESTIONNAIRE - PHQ9
2. FEELING DOWN, DEPRESSED OR HOPELESS: 0
SUM OF ALL RESPONSES TO PHQ QUESTIONS 1-9: 0
1. LITTLE INTEREST OR PLEASURE IN DOING THINGS: 0
SUM OF ALL RESPONSES TO PHQ QUESTIONS 1-9: 0
SUM OF ALL RESPONSES TO PHQ QUESTIONS 1-9: 0
SUM OF ALL RESPONSES TO PHQ9 QUESTIONS 1 & 2: 0
SUM OF ALL RESPONSES TO PHQ QUESTIONS 1-9: 0

## 2024-01-25 NOTE — PROGRESS NOTES
SUBJECTIVE:   33 y.o.  female here for routine OB appointment.    +FM. No lof, vb, ctx.  Declines flu and covid vaccine.  Discussed tdap and rsv-pt declines.   28wk labs reviewed. Pt failed 1 hr-186. Dx GDMA. Hgb 9.6- will start Iron  F/u 2 wks  .  OB History    Para Term  AB Living   1             SAB IAB Ectopic Molar Multiple Live Births                    # Outcome Date GA Lbr Ab/2nd Weight Sex Delivery Anes PTL Lv   1 Current                Past Medical History:   Diagnosis Date    Anxiety     Fibroadenoma         Past Surgical History:   Procedure Laterality Date    BREAST BIOPSY          Family History   Problem Relation Age of Onset    Diabetes Mother     Diabetes Father     Pervasive Developmental Disorders  Maternal Aunt         developmental disability    Other Maternal Cousin         developmental disability        Social History       Tobacco History       Smoking Status  Never      Smokeless Tobacco Use  Never              Alcohol History       Alcohol Use Status  Never              Drug Use       Drug Use Status  Never              Sexual Activity       Sexually Active  Yes Partners  Male                      Current Outpatient Medications:     ferrous sulfate (IRON 325) 325 (65 Fe) MG tablet, Take 1 tablet by mouth 2 times daily, Disp: 180 tablet, Rfl: 1    Prenatal Vit-Fe Fumarate-FA (PRENATAL VITAMIN) 27-0.8 MG TABS, Take 1 tablet by mouth daily, Disp: 90 tablet, Rfl: 4    glucose monitoring kit, 1 kit by Does not apply route 4 times daily Check sugar in the morning when you wake up and 2 hrs after meals (Patient not taking: Reported on 2024), Disp: 1 kit, Rfl: 0    blood glucose monitor strips, Test 4 times a day & as needed for symptoms of irregular blood glucose. Dispense sufficient amount for indicated testing frequency plus additional to accommodate PRN testing needs. (Patient not taking: Reported on 2024), Disp: 200 strip, Rfl: 5    Lancets MISC, 1 each by Does

## 2024-01-25 NOTE — PROGRESS NOTES
Patient alert and pleasant today with no complaints.  Here today for prenatal visit.  Urine for glucose is 4+ and protein is negative. Patient states she has not been monitoring BS.  Fetal heart tones obtained without difficulty.  Discharge instructions given and patient directed to call the office with any questions or concerns and verbalized understanding.

## 2024-01-26 ENCOUNTER — HOSPITAL ENCOUNTER (OUTPATIENT)
Dept: DIABETES SERVICES | Age: 34
Setting detail: THERAPIES SERIES
Discharge: HOME | End: 2024-01-26
Payer: COMMERCIAL

## 2024-01-26 VITALS — BODY MASS INDEX: 26.95 KG/M2 | WEIGHT: 167 LBS

## 2024-01-26 PROCEDURE — G0108 DIAB MANAGE TRN  PER INDIV: HCPCS

## 2024-01-26 ASSESSMENT — PROBLEM AREAS IN DIABETES QUESTIONNAIRE (PAID)
PAID-5 TOTAL SCORE: 13
FEELING SCARED WHEN YOU THINK ABOUT LIVING WITH DIABETES: 2
WORRYING ABOUT THE FUTURE AND THE POSSIBILITY OF SERIOUS COMPLICATIONS: 4
COPING WITH COMPLICATIONS OF DIABETES: 2
FEELING DEPRESSED WHEN YOU THINK ABOUT LIVING WITH DIABETES: 2
FEELING THAT DIABETES IS TAKING UP TOO MUCH OF YOUR MENTAL AND PHYSICAL ENERGY EVERY DAY: 3

## 2024-01-26 NOTE — PROGRESS NOTES
Diabetes Self-Management Education Record    Participant Name: Alyse Art  Referring Provider: Brett Jacobs MD  Assessment/Evaluation Ratings:  1=Needs Instruction   4=Demonstrates Understanding/Competency  2=Needs Review   NC=Not Covered    3=Comprehends Key Points  N/A=Not Applicable  Topics/Learning Objectives Pre-session Assess Date:  Instructor initials/date  1/26/24 MW Instruction provided     Yearly follow-up/  reinforcement date. Post- session Eval Comments   Diabetes disease process & Treatment process:   -Define type of diabetes in simple terms.  - Describe the ABCs of  diabetes management  -Identify own type of diabetes  -Identify lifestyle changes/treatment options  -other:  1 [x] All     []  []  []  []  []  4 1/26/24 MW  Patient with GDM   Family hx T2D   Developing strategies for Healthy coping/psychosocial issues:    -Describe feelings about living with diabetes  -Identify coping strategies and sources of stress  -Identify support needed & support network available  -Complete PAID-5 Diabetes questionnaire 1 [x] All     []  []  []    []  4 Date: 1/26/24  PAID-5 Score: 13  Confidence Score: 5/10           Prevention, detection & treatment of Chronic complications:    -Identify the prevention, detection and treatment for complications including immunizations, preventive eye, foot, dental and renal exams as indicated per the participant's duration of diabetes and health status.  -Define the natural course of diabetes and the relationship of blood glucose levels to long term complications of diabetes.  1 [x] All     []            []  4    Prevention, detection & treatment of acute complications:    -State the causes,signs & symptoms of hyper & hypoglycemia, and prevention & treatment strategies.   -Describe sick day guidelines  DKA /indications for ketone testing &  when to call physician  1 [x] All     []      []    4              -Identify severe weather/situation crisis  & diabetes supplies

## 2024-02-08 ENCOUNTER — ROUTINE PRENATAL (OUTPATIENT)
Dept: OBGYN | Age: 34
End: 2024-02-08
Payer: COMMERCIAL

## 2024-02-08 VITALS
HEART RATE: 105 BPM | WEIGHT: 168.1 LBS | SYSTOLIC BLOOD PRESSURE: 113 MMHG | DIASTOLIC BLOOD PRESSURE: 78 MMHG | BODY MASS INDEX: 27.13 KG/M2

## 2024-02-08 DIAGNOSIS — Z34.03 ENCOUNTER FOR SUPERVISION OF NORMAL FIRST PREGNANCY IN THIRD TRIMESTER: Primary | ICD-10-CM

## 2024-02-08 DIAGNOSIS — Z3A.31 31 WEEKS GESTATION OF PREGNANCY: ICD-10-CM

## 2024-02-08 DIAGNOSIS — Z34.93 ENCOUNTER FOR SUPERVISION OF NORMAL PREGNANCY IN THIRD TRIMESTER, UNSPECIFIED GRAVIDITY: ICD-10-CM

## 2024-02-08 LAB
GLUCOSE URINE, POC: NEGATIVE
PROTEIN UA: NEGATIVE

## 2024-02-08 PROCEDURE — 81002 URINALYSIS NONAUTO W/O SCOPE: CPT | Performed by: OBSTETRICS & GYNECOLOGY

## 2024-02-08 PROCEDURE — 0502F SUBSEQUENT PRENATAL CARE: CPT | Performed by: OBSTETRICS & GYNECOLOGY

## 2024-02-08 PROCEDURE — 99213 OFFICE O/P EST LOW 20 MIN: CPT | Performed by: OBSTETRICS & GYNECOLOGY

## 2024-02-08 NOTE — PROGRESS NOTES
SUBJECTIVE:   33 y.o.  female here for routine OB appointment.    +FM. No lof, vb, ctx.  Declines vaccines.   Postprandials controled with diet. Fastings elevated. Pt is going to try different snack at night. Pt has been walking.   F/u 2 wks. Schedule growth US.    .  OB History    Para Term  AB Living   1             SAB IAB Ectopic Molar Multiple Live Births                    # Outcome Date GA Lbr Ab/2nd Weight Sex Delivery Anes PTL Lv   1 Current                Past Medical History:   Diagnosis Date    Anxiety     Fibroadenoma         Past Surgical History:   Procedure Laterality Date    BREAST BIOPSY          Family History   Problem Relation Age of Onset    Diabetes Mother     Diabetes Father     Pervasive Developmental Disorders  Maternal Aunt         developmental disability    Other Maternal Cousin         developmental disability        Social History       Tobacco History       Smoking Status  Never      Smokeless Tobacco Use  Never              Alcohol History       Alcohol Use Status  Never              Drug Use       Drug Use Status  Never              Sexual Activity       Sexually Active  Yes Partners  Male                      Current Outpatient Medications:     glucose monitoring kit, 1 kit by Does not apply route 4 times daily Check sugar in the morning when you wake up and 2 hrs after meals, Disp: 1 kit, Rfl: 0    blood glucose monitor strips, Test 4 times a day & as needed for symptoms of irregular blood glucose. Dispense sufficient amount for indicated testing frequency plus additional to accommodate PRN testing needs., Disp: 200 strip, Rfl: 5    Lancets MISC, 1 each by Does not apply route in the morning, at noon, in the evening, and at bedtime, Disp: 200 each, Rfl: 5    ferrous sulfate (IRON 325) 325 (65 Fe) MG tablet, Take 1 tablet by mouth 2 times daily, Disp: 180 tablet, Rfl: 1    Prenatal Vit-Fe Fumarate-FA (PRENATAL VITAMIN) 27-0.8 MG TABS, Take 1 tablet by mouth

## 2024-02-08 NOTE — PROGRESS NOTES
Patient alert and pleasant today with no complaints.  Here today for prenatal visit.  Urine for glucose and protein obtained with negative results.  Fetal heart tones obtained without difficulty. Patient brought in Blood sugar logs.   Discharge instructions given and patient directed to call the office with any questions or concerns and verbalized understanding.

## 2024-02-22 ENCOUNTER — ROUTINE PRENATAL (OUTPATIENT)
Dept: OBGYN CLINIC | Age: 34
End: 2024-02-22
Payer: COMMERCIAL

## 2024-02-22 ENCOUNTER — ROUTINE PRENATAL (OUTPATIENT)
Dept: OBGYN | Age: 34
End: 2024-02-22
Payer: COMMERCIAL

## 2024-02-22 ENCOUNTER — ANCILLARY PROCEDURE (OUTPATIENT)
Dept: OBGYN CLINIC | Age: 34
End: 2024-02-22
Payer: COMMERCIAL

## 2024-02-22 VITALS
SYSTOLIC BLOOD PRESSURE: 117 MMHG | BODY MASS INDEX: 27.57 KG/M2 | WEIGHT: 170.8 LBS | DIASTOLIC BLOOD PRESSURE: 82 MMHG | HEART RATE: 111 BPM

## 2024-02-22 VITALS
HEART RATE: 125 BPM | WEIGHT: 168 LBS | SYSTOLIC BLOOD PRESSURE: 116 MMHG | DIASTOLIC BLOOD PRESSURE: 76 MMHG | BODY MASS INDEX: 27.12 KG/M2

## 2024-02-22 DIAGNOSIS — Z3A.33 33 WEEKS GESTATION OF PREGNANCY: ICD-10-CM

## 2024-02-22 DIAGNOSIS — Z3A.33 33 WEEKS GESTATION OF PREGNANCY: Primary | ICD-10-CM

## 2024-02-22 DIAGNOSIS — Z34.03 ENCOUNTER FOR SUPERVISION OF NORMAL FIRST PREGNANCY IN THIRD TRIMESTER: Primary | ICD-10-CM

## 2024-02-22 DIAGNOSIS — O24.414 INSULIN CONTROLLED GESTATIONAL DIABETES MELLITUS (GDM) IN THIRD TRIMESTER: ICD-10-CM

## 2024-02-22 LAB
GLUCOSE URINE, POC: NORMAL
PROTEIN UA: NEGATIVE

## 2024-02-22 PROCEDURE — 76805 OB US >/= 14 WKS SNGL FETUS: CPT | Performed by: OBSTETRICS & GYNECOLOGY

## 2024-02-22 PROCEDURE — 81002 URINALYSIS NONAUTO W/O SCOPE: CPT | Performed by: OBSTETRICS & GYNECOLOGY

## 2024-02-22 PROCEDURE — 99213 OFFICE O/P EST LOW 20 MIN: CPT | Performed by: OBSTETRICS & GYNECOLOGY

## 2024-02-22 PROCEDURE — 0502F SUBSEQUENT PRENATAL CARE: CPT | Performed by: OBSTETRICS & GYNECOLOGY

## 2024-02-22 RX ORDER — INSULIN HUMAN 100 [IU]/ML
8 INJECTION, SUSPENSION SUBCUTANEOUS NIGHTLY
Qty: 5 ADJUSTABLE DOSE PRE-FILLED PEN SYRINGE | Refills: 0 | Status: SHIPPED | OUTPATIENT
Start: 2024-02-22

## 2024-02-22 NOTE — PROGRESS NOTES
Patient is here today for growth. Denies any vaginal bleeding, or leakage of fluids.  Mild cramping.  Patient reports good fetal movement.     
dipstick protein   Result Value Ref Range    Protein, UA Negative Negative   POCT urine qual dipstick glucose   Result Value Ref Range    Glucose, UA POC neg         A/an growth ultrasound was done in our office today. Please refer to the enclosed copy of the ultrasound report for further information.    Discussion:  There is a collado fetus in a vertex presentation.  Fetal cardiac motion fetal motion and fetal tone was observed and appears to be grossly normal.  There is been appropriate interval growth.  The baby is AGA at the 83rd percentile.  2691 g 5 pounds 15 ounces.  No anomalies are noted.  The placenta is anterior.  Amniotic fluid volume is normal.  I reviewed her blood sugars her fasting sugars are high.  Significantly high that I think we need to start medication.  Patient was given the option of 3 medications glyburide/metformin or insulin.  She is elected to start insulin we will start at 8 units at night and increase accordingly.  Also had a very long discussion regarding diet.  I recommend that she be on a lower carbohydrate diet.    IMPRESSION:  1. Single intrauterine gestation at 33+ weeks with appropriate growth and gestational diabetes.  2.  No obvious fetal anomalies are noted.  The fetus is in a vertex presentation.  3.  The amniotic fluid volume is normal and the placenta is anterior.    RECOMMENDATIONS:  Each of the recommendations were discussed with the patient:  1.  Return in 2 weeks for diabetic management.  2.  Continue growth ultrasounds every 4 weeks.  3.  Consider antepartum testing at 35 weeks gestation if blood sugars are still running high  4.  Delivery at 39 weeks gestation  5.  Follow-up would be otherwise as clinically indicated.    The patient is to continue to follow with you in your office for ongoing obstetric care.     PLAN:    As noted above or sooner prn.    Sincerely,        Johnathan Reynolds MD    I spent 35 minutes of direct contact time with the patient of which

## 2024-02-22 NOTE — PROGRESS NOTES
SUBJECTIVE:   33 y.o.  female here for routine OB appointment.  FOB here today. Takes bar next week.  +FM. No lof, vb, ctx.  Declines vaccines.   Postprandials controled with diet. Fastings elevated. MFM started insulin.  Growth US today.   Discussed PIH and PTL precautions.   F/u 1 wks.     .  OB History    Para Term  AB Living   1             SAB IAB Ectopic Molar Multiple Live Births                    # Outcome Date GA Lbr Ab/2nd Weight Sex Delivery Anes PTL Lv   1 Current                Past Medical History:   Diagnosis Date    Anxiety     Fibroadenoma         Past Surgical History:   Procedure Laterality Date    BREAST BIOPSY          Family History   Problem Relation Age of Onset    Diabetes Mother     Diabetes Father     Pervasive Developmental Disorders  Maternal Aunt         developmental disability    Other Maternal Cousin         developmental disability        Social History       Tobacco History       Smoking Status  Never      Smokeless Tobacco Use  Never              Alcohol History       Alcohol Use Status  Never              Drug Use       Drug Use Status  Never              Sexual Activity       Sexually Active  Yes Partners  Male                      Current Outpatient Medications:     glucose monitoring kit, 1 kit by Does not apply route 4 times daily Check sugar in the morning when you wake up and 2 hrs after meals, Disp: 1 kit, Rfl: 0    blood glucose monitor strips, Test 4 times a day & as needed for symptoms of irregular blood glucose. Dispense sufficient amount for indicated testing frequency plus additional to accommodate PRN testing needs., Disp: 200 strip, Rfl: 5    Lancets MISC, 1 each by Does not apply route in the morning, at noon, in the evening, and at bedtime, Disp: 200 each, Rfl: 5    ferrous sulfate (IRON 325) 325 (65 Fe) MG tablet, Take 1 tablet by mouth 2 times daily, Disp: 180 tablet, Rfl: 1    Prenatal Vit-Fe Fumarate-FA (PRENATAL VITAMIN) 27-0.8 MG TABS,

## 2024-02-29 ENCOUNTER — TELEPHONE (OUTPATIENT)
Dept: OBGYN | Age: 34
End: 2024-02-29

## 2024-02-29 NOTE — TELEPHONE ENCOUNTER
Reached out to patient and informed her that her insurance plan sent us a letter to inform us that they would like to her get a dental exam since she is pregnant. Patient verbalized understanding and states she has too many appointments as it is right now.

## 2024-03-01 DIAGNOSIS — Z3A.33 33 WEEKS GESTATION OF PREGNANCY: ICD-10-CM

## 2024-03-01 DIAGNOSIS — Z34.03 ENCOUNTER FOR SUPERVISION OF NORMAL FIRST PREGNANCY IN THIRD TRIMESTER: ICD-10-CM

## 2024-03-01 LAB
HCT VFR BLD CALC: 29.6 % (ref 34–48)
HEMOGLOBIN: 9.3 G/DL (ref 11.5–15.5)
MCH RBC QN AUTO: 27.4 PG (ref 26–35)
MCHC RBC AUTO-ENTMCNC: 31.4 G/DL (ref 32–34.5)
MCV RBC AUTO: 87.1 FL (ref 80–99.9)
PDW BLD-RTO: 13.8 % (ref 11.5–15)
PLATELET # BLD: 159 K/UL (ref 130–450)
PMV BLD AUTO: 12.9 FL (ref 7–12)
RBC # BLD: 3.4 M/UL (ref 3.5–5.5)
WBC # BLD: 8.8 K/UL (ref 4.5–11.5)

## 2024-03-07 ENCOUNTER — ROUTINE PRENATAL (OUTPATIENT)
Dept: OBGYN CLINIC | Age: 34
End: 2024-03-07
Payer: COMMERCIAL

## 2024-03-07 ENCOUNTER — ANCILLARY PROCEDURE (OUTPATIENT)
Dept: OBGYN CLINIC | Age: 34
End: 2024-03-07
Payer: COMMERCIAL

## 2024-03-07 ENCOUNTER — ROUTINE PRENATAL (OUTPATIENT)
Dept: OBGYN | Age: 34
End: 2024-03-07

## 2024-03-07 VITALS
BODY MASS INDEX: 27.76 KG/M2 | SYSTOLIC BLOOD PRESSURE: 117 MMHG | DIASTOLIC BLOOD PRESSURE: 84 MMHG | WEIGHT: 172 LBS | HEART RATE: 100 BPM

## 2024-03-07 VITALS
BODY MASS INDEX: 27.91 KG/M2 | DIASTOLIC BLOOD PRESSURE: 78 MMHG | WEIGHT: 172.9 LBS | HEART RATE: 96 BPM | SYSTOLIC BLOOD PRESSURE: 109 MMHG

## 2024-03-07 DIAGNOSIS — Z34.83 ENCOUNTER FOR SUPERVISION OF OTHER NORMAL PREGNANCY IN THIRD TRIMESTER: Primary | ICD-10-CM

## 2024-03-07 DIAGNOSIS — O24.414 INSULIN CONTROLLED GESTATIONAL DIABETES MELLITUS (GDM) IN THIRD TRIMESTER: Primary | ICD-10-CM

## 2024-03-07 DIAGNOSIS — Z3A.35 35 WEEKS GESTATION OF PREGNANCY: ICD-10-CM

## 2024-03-07 PROBLEM — Z34.03 PRIMIGRAVIDA, THIRD TRIMESTER: Status: ACTIVE | Noted: 2023-09-01

## 2024-03-07 LAB
GLUCOSE URINE, POC: NORMAL
PROTEIN UA: NEGATIVE

## 2024-03-07 PROCEDURE — 99213 OFFICE O/P EST LOW 20 MIN: CPT | Performed by: OBSTETRICS & GYNECOLOGY

## 2024-03-07 PROCEDURE — 81002 URINALYSIS NONAUTO W/O SCOPE: CPT | Performed by: OBSTETRICS & GYNECOLOGY

## 2024-03-07 PROCEDURE — 76818 FETAL BIOPHYS PROFILE W/NST: CPT | Performed by: OBSTETRICS & GYNECOLOGY

## 2024-03-07 NOTE — PROGRESS NOTES
Patient alert and pleasant today with no complaints.  Here today for prenatal visit.  Urine for glucose and protein obtained with negative results at [facility] appt today.  Fetal heart tones obtained without difficulty. Patients to discuss iron supplements.   Discharge instructions given and patient directed to call the office with any questions or concerns and verbalized understanding. Culture obtained, labeled and sent to lab.

## 2024-03-07 NOTE — PROGRESS NOTES
3/7/24    Guillermina Restrepo, DO  [address]  [address] [place] [address]     RE:  MARGE ART  : 1990   AGE: 33 y.o.    This report has been created using voice recognition software. It may contain errors which are inherent in voice recognition technology.    Dear Dr. Restrepo:    Marge Art had an appointment today for the following indications:    Patient Active Problem List   Diagnosis    Primigravida, third trimester    Insulin controlled gestational diabetes mellitus (GDM) in third trimester     Marge Art is a 33 y.o. female, who is G1(0). She has an Estimated Date of Delivery: 2024 based on her established dates.  She is currently 35 weeks 6 days gestation based on that assessment.     The patient follows with Dr. Reynolds in our office for ongoing evaluation and management of her gestational diabetes.  She currently takes Humulin and insulin for management.  Her blood sugar control has been good on her current therapeutic regimen.  She follows her carbohydrate controlled diet.    The patient states that her fetal movement has been good.  She has had no vaginal bleeding or leaking of amniotic fluid.    The nonstress test today was reactive, with moderate variability and accelerations.    A fetal ultrasound evaluation was performed on 3/7/2024.  A detailed report is included in the EMR under the imaging tab.  A living collado intrauterine fetus was identified in the cephalic presentation, with normal fetal heart motion and normal fetal motion noted.  The amniotic fluid index was 15.8 cm.  The biophysical profile was scored 10 of 10.                     GENETIC SCREENING/TERATOLOGY COUNSELING                  (Includes patient, FTB, and any affected family members)    Patient Age > 35 Years NO   Thalassemia ( MVC<80) NO   Congential Heart Defect NO   Neural Tube Defect NO   Dennis-Sachs NO   Sickle Cell Disease NO   Sickle Cell Trait NO   Sickle C Disease or Trait NO   Hemophilia

## 2024-03-07 NOTE — PROGRESS NOTES
SUBJECTIVE:   33 y.o.  female here for routine OB appointment.    +FM. No lof, vb, ctx.  Declines vaccines.   Postprandials controled with diet. Fastings elevated. MFM started insulin last visit. On 8 units lantus qhs.   Growth US last visit. 83rd percentile.   Discussed PIH and PTL precautions.   Hgb 9.3. pt not tolerating oral iron. Pt is increasing iron rich foods and is going to try OTC liquid iron. Consider IV iron if she would like.    GBS today.   F/u 1 wks.     .  OB History    Para Term  AB Living   1             SAB IAB Ectopic Molar Multiple Live Births                    # Outcome Date GA Lbr Ab/2nd Weight Sex Delivery Anes PTL Lv   1 Current                Past Medical History:   Diagnosis Date    Anxiety     Fibroadenoma         Past Surgical History:   Procedure Laterality Date    BREAST BIOPSY          Family History   Problem Relation Age of Onset    Diabetes Mother     Diabetes Father     Pervasive Developmental Disorders  Maternal Aunt         developmental disability    Other Maternal Cousin         developmental disability        Social History       Tobacco History       Smoking Status  Never      Smokeless Tobacco Use  Never              Alcohol History       Alcohol Use Status  Never              Drug Use       Drug Use Status  Never              Sexual Activity       Sexually Active  Yes Partners  Male                      Current Outpatient Medications:     insulin NPH (HUMULIN N KWIKPEN) 100 UNIT/ML injection pen, Inject 8 Units into the skin nightly May need up to 40 units a day, Disp: 5 Adjustable Dose Pre-filled Pen Syringe, Rfl: 0    glucose monitoring kit, 1 kit by Does not apply route 4 times daily Check sugar in the morning when you wake up and 2 hrs after meals, Disp: 1 kit, Rfl: 0    blood glucose monitor strips, Test 4 times a day & as needed for symptoms of irregular blood glucose. Dispense sufficient amount for indicated testing frequency plus additional to

## 2024-03-07 NOTE — PROGRESS NOTES
Patient is here today for 2 wk f/u. Denies any vaginal bleeding,  or leakage of fluids.  Cramping on and off.  Patient reports good fetal movement.

## 2024-03-10 LAB
CULTURE: NORMAL
SPECIMEN DESCRIPTION: NORMAL

## 2024-03-11 LAB
CULTURE: NORMAL
SPECIMEN DESCRIPTION: NORMAL

## 2024-03-14 ENCOUNTER — ANCILLARY PROCEDURE (OUTPATIENT)
Dept: OBGYN CLINIC | Age: 34
End: 2024-03-14
Payer: COMMERCIAL

## 2024-03-14 ENCOUNTER — ROUTINE PRENATAL (OUTPATIENT)
Dept: OBGYN | Age: 34
End: 2024-03-14
Payer: COMMERCIAL

## 2024-03-14 ENCOUNTER — ROUTINE PRENATAL (OUTPATIENT)
Dept: OBGYN CLINIC | Age: 34
End: 2024-03-14
Payer: COMMERCIAL

## 2024-03-14 VITALS
BODY MASS INDEX: 28.25 KG/M2 | HEART RATE: 91 BPM | WEIGHT: 175 LBS | DIASTOLIC BLOOD PRESSURE: 79 MMHG | SYSTOLIC BLOOD PRESSURE: 124 MMHG

## 2024-03-14 VITALS
WEIGHT: 175.3 LBS | HEART RATE: 86 BPM | BODY MASS INDEX: 28.29 KG/M2 | SYSTOLIC BLOOD PRESSURE: 125 MMHG | DIASTOLIC BLOOD PRESSURE: 84 MMHG

## 2024-03-14 DIAGNOSIS — Z3A.36 36 WEEKS GESTATION OF PREGNANCY: Primary | ICD-10-CM

## 2024-03-14 DIAGNOSIS — Z34.83 ENCOUNTER FOR SUPERVISION OF OTHER NORMAL PREGNANCY IN THIRD TRIMESTER: Primary | ICD-10-CM

## 2024-03-14 DIAGNOSIS — Z3A.36 36 WEEKS GESTATION OF PREGNANCY: ICD-10-CM

## 2024-03-14 LAB
GLUCOSE URINE, POC: NORMAL
PROTEIN UA: NEGATIVE

## 2024-03-14 PROCEDURE — 76816 OB US FOLLOW-UP PER FETUS: CPT | Performed by: OBSTETRICS & GYNECOLOGY

## 2024-03-14 PROCEDURE — 99213 OFFICE O/P EST LOW 20 MIN: CPT | Performed by: OBSTETRICS & GYNECOLOGY

## 2024-03-14 PROCEDURE — 76818 FETAL BIOPHYS PROFILE W/NST: CPT | Performed by: OBSTETRICS & GYNECOLOGY

## 2024-03-14 PROCEDURE — 0502F SUBSEQUENT PRENATAL CARE: CPT | Performed by: OBSTETRICS & GYNECOLOGY

## 2024-03-14 PROCEDURE — 81002 URINALYSIS NONAUTO W/O SCOPE: CPT | Performed by: OBSTETRICS & GYNECOLOGY

## 2024-03-14 NOTE — PROGRESS NOTES
SUBJECTIVE:   33 y.o.  female here for routine OB appointment.    +FM. No lof, vb, ctx.  Declines vaccines.   Sugars controlled on 8 units of lantus.   Discussed PIH and PTL precautions.   Hgb 9.3. pt taking oral iron.   GBS negative.   Growth today 95th percentile, AC 99th percentile. Baby 8 lbs today.   F/u 1 wks.   IOL 3/29/24 at 0700.  .  OB History    Para Term  AB Living   1             SAB IAB Ectopic Molar Multiple Live Births                    # Outcome Date GA Lbr Ab/2nd Weight Sex Delivery Anes PTL Lv   1 Current                Past Medical History:   Diagnosis Date    Anxiety     Fibroadenoma         Past Surgical History:   Procedure Laterality Date    BREAST BIOPSY          Family History   Problem Relation Age of Onset    Diabetes Mother     Diabetes Father     Pervasive Developmental Disorders  Maternal Aunt         developmental disability    Other Maternal Cousin         developmental disability        Social History       Tobacco History       Smoking Status  Never      Smokeless Tobacco Use  Never              Alcohol History       Alcohol Use Status  Never              Drug Use       Drug Use Status  Never              Sexual Activity       Sexually Active  Yes Partners  Male                      Current Outpatient Medications:     insulin NPH (HUMULIN N KWIKPEN) 100 UNIT/ML injection pen, Inject 8 Units into the skin nightly May need up to 40 units a day, Disp: 5 Adjustable Dose Pre-filled Pen Syringe, Rfl: 0    glucose monitoring kit, 1 kit by Does not apply route 4 times daily Check sugar in the morning when you wake up and 2 hrs after meals, Disp: 1 kit, Rfl: 0    blood glucose monitor strips, Test 4 times a day & as needed for symptoms of irregular blood glucose. Dispense sufficient amount for indicated testing frequency plus additional to accommodate PRN testing needs., Disp: 200 strip, Rfl: 5    Lancets MISC, 1 each by Does not apply route in the morning, at noon, in

## 2024-03-14 NOTE — PROGRESS NOTES
3/14/24    Guillermina Restrepo, DO  [address] [facility] [address]  [address] [place] [address]     RE:  MARGE ART  : 1990   AGE: 33 y.o.    This report has been created using voice recognition software. It may contain errors which are inherent in voice recognition technology.    Dear Dr. Restrepo:    Marge Art had an appointment today for the following indications:    Patient Active Problem List   Diagnosis    Primigravida, third trimester    Insulin controlled gestational diabetes mellitus (GDM) in third trimester     Marge Art is a 33 y.o. female, who is G1(0). She has an Estimated Date of Delivery: 2024 based on her established dates.  She is currently 36 weeks 6 days gestation based on that assessment.     The patient follows with Dr. Reynolds in our office for ongoing evaluation and management of her gestational diabetes.  She currently takes Humulin and insulin for management.  Her blood sugar control has been good on her current therapeutic regimen.  She follows her carbohydrate controlled diet.    The patient states that her fetal movement has been good.  She has had no vaginal bleeding or leaking of amniotic fluid.    The nonstress test today was reactive, with moderate variability and accelerations.    A fetal ultrasound evaluation was performed on 3/7/2024.  A detailed report is included in the EMR under the imaging tab.  A living collado intrauterine fetus was identified in the cephalic presentation, with normal fetal heart motion and normal fetal motion noted.  The amniotic fluid index was 15.8 cm.  The biophysical profile was scored 10 of 10.    A fetal ultrasound evaluation was performed on 3/14/2024.  A detailed report is included in the EMR under the imaging tab.  A living collado intrauterine fetus was identified in the cephalic presentation, with normal fetal heart motion and normal fetal motion noted.  The amniotic fluid index was 16.1 cm.  The biophysical profile was scored 10

## 2024-03-21 ENCOUNTER — ROUTINE PRENATAL (OUTPATIENT)
Dept: OBGYN | Age: 34
End: 2024-03-21
Payer: COMMERCIAL

## 2024-03-21 ENCOUNTER — TELEPHONE (OUTPATIENT)
Dept: OBGYN | Age: 34
End: 2024-03-21

## 2024-03-21 ENCOUNTER — ROUTINE PRENATAL (OUTPATIENT)
Dept: OBGYN CLINIC | Age: 34
End: 2024-03-21
Payer: COMMERCIAL

## 2024-03-21 ENCOUNTER — ANCILLARY PROCEDURE (OUTPATIENT)
Dept: OBGYN CLINIC | Age: 34
End: 2024-03-21
Payer: COMMERCIAL

## 2024-03-21 VITALS
BODY MASS INDEX: 27.76 KG/M2 | WEIGHT: 172 LBS | SYSTOLIC BLOOD PRESSURE: 125 MMHG | DIASTOLIC BLOOD PRESSURE: 84 MMHG | HEART RATE: 108 BPM

## 2024-03-21 VITALS
BODY MASS INDEX: 27.76 KG/M2 | WEIGHT: 172 LBS | DIASTOLIC BLOOD PRESSURE: 88 MMHG | HEART RATE: 100 BPM | SYSTOLIC BLOOD PRESSURE: 127 MMHG

## 2024-03-21 DIAGNOSIS — O36.63X0 EXCESSIVE FETAL GROWTH AFFECTING MANAGEMENT OF PREGNANCY IN THIRD TRIMESTER, SINGLE OR UNSPECIFIED FETUS: Primary | ICD-10-CM

## 2024-03-21 DIAGNOSIS — Z3A.37 37 WEEKS GESTATION OF PREGNANCY: ICD-10-CM

## 2024-03-21 DIAGNOSIS — Z34.93 ENCOUNTER FOR SUPERVISION OF NORMAL PREGNANCY IN THIRD TRIMESTER, UNSPECIFIED GRAVIDITY: Primary | ICD-10-CM

## 2024-03-21 DIAGNOSIS — O24.414 INSULIN CONTROLLED GESTATIONAL DIABETES MELLITUS (GDM) IN THIRD TRIMESTER: ICD-10-CM

## 2024-03-21 DIAGNOSIS — Z34.03 PRIMIGRAVIDA, THIRD TRIMESTER: ICD-10-CM

## 2024-03-21 LAB
GLUCOSE URINE, POC: NORMAL
PROTEIN UA: NEGATIVE

## 2024-03-21 PROCEDURE — 81002 URINALYSIS NONAUTO W/O SCOPE: CPT | Performed by: OBSTETRICS & GYNECOLOGY

## 2024-03-21 PROCEDURE — 99213 OFFICE O/P EST LOW 20 MIN: CPT | Performed by: OBSTETRICS & GYNECOLOGY

## 2024-03-21 PROCEDURE — 76818 FETAL BIOPHYS PROFILE W/NST: CPT | Performed by: OBSTETRICS & GYNECOLOGY

## 2024-03-21 PROCEDURE — 0502F SUBSEQUENT PRENATAL CARE: CPT | Performed by: OBSTETRICS & GYNECOLOGY

## 2024-03-21 NOTE — PROGRESS NOTES
Patient alert and pleasant today with no complaints.  Here today for prenatal visit.  Urine for glucose and protein obtained with negative results at [facility] appt earlier.  Fetal heart tones obtained without difficulty.  Discharge instructions given and patient directed to call the office with any questions or concerns and verbalized understanding.     
evening, and at bedtime, Disp: 200 each, Rfl: 5    Prenatal Vit-Fe Fumarate-FA (PRENATAL VITAMIN) 27-0.8 MG TABS, Take 1 tablet by mouth daily, Disp: 90 tablet, Rfl: 4     No Known Allergies     OBJECTIVE:   /88   Pulse 100   Wt 78 kg (172 lb)   LMP 2023   BMI 27.76 kg/m²     Mother's Prenatal Vitals  BP: 127/88  Weight - Scale: 78 kg (172 lb)  Pulse: 100  Patient Position: Sitting  Prenatal Fetal Information  Fetal HR: 146  Movement: Present      ASSESSMENT:   1. Alyse Art is a 33 y.o. female  2.   3. 37w6d  4. Anxiety  5. Nephew has autism  6. Rubella equivical  7. GMDA2  8. Anemia Hgb 9.6  9. Fetal macrosomia- EFW 9  Patient Active Problem List    Diagnosis Date Noted    Excessive fetal growth affecting management of pregnancy in third trimester 2024    Insulin controlled gestational diabetes mellitus (GDM) in third trimester 2024    Primigravida, third trimester 2023        Diagnosis Orders   1. Encounter for supervision of normal pregnancy in third trimester, unspecified         2. 37 weeks gestation of pregnancy                PLAN:     No orders of the defined types were placed in this encounter.     Return in about 1 week (around 3/28/2024) for Prenatal visit.      Electronically signed by Guillermina Restrepo DO on 23

## 2024-03-21 NOTE — TELEPHONE ENCOUNTER
----- Message from Guillermina Restrepo, DO sent at 3/19/2024  5:06 PM EDT -----  Regarding: induction  Please let pt know she is scheduled for induction 3/29 at 0700. Thank you.

## 2024-03-21 NOTE — PROGRESS NOTES
3/21/24    Guillermina Restrepo, DO  [address] [facility] [address]  [address] [place] [address]     RE:  MARGE ART  : 1990   AGE: 33 y.o.    This report has been created using voice recognition software. It may contain errors which are inherent in voice recognition technology.    Dear Dr. Restrepo:    Marge Art had an appointment today for the following indications:    Patient Active Problem List   Diagnosis    Primigravida, third trimester    Insulin controlled gestational diabetes mellitus (GDM) in third trimester     Marge Art is a 33 y.o. female, who is G1(0). She has an Estimated Date of Delivery: 2024 based on her established dates.  She is currently 37 weeks 6 days gestation based on that assessment.     The patient follows with Dr. Reynolds in our office for ongoing evaluation and management of her gestational diabetes. She currently takes Humulin and insulin for management.  Her blood sugar control has been good on her current therapeutic regimen.  She follows her carbohydrate controlled diet.    The patient states that her fetal movement has been good.  She has had no vaginal bleeding or leaking of amniotic fluid.    The nonstress test today was reactive, with moderate variability and accelerations.    A fetal ultrasound evaluation was performed on 3/7/2024.  A detailed report is included in the EMR under the imaging tab.  A living collado intrauterine fetus was identified in the cephalic presentation, with normal fetal heart motion and normal fetal motion noted.  The amniotic fluid index was 15.8 cm.  The biophysical profile was scored 10 of 10.    A fetal ultrasound evaluation was performed on 3/14/2024.  A detailed report is included in the EMR under the imaging tab.  A living collado intrauterine fetus was identified in the cephalic presentation, with normal fetal heart motion and normal fetal motion noted.  The amniotic fluid index was 16.1 cm.  The biophysical profile was scored 10

## 2024-03-26 ENCOUNTER — ROUTINE PRENATAL (OUTPATIENT)
Dept: OBGYN CLINIC | Age: 34
End: 2024-03-26
Payer: COMMERCIAL

## 2024-03-26 ENCOUNTER — ANCILLARY PROCEDURE (OUTPATIENT)
Dept: OBGYN CLINIC | Age: 34
End: 2024-03-26
Payer: COMMERCIAL

## 2024-03-26 VITALS
WEIGHT: 175.3 LBS | SYSTOLIC BLOOD PRESSURE: 122 MMHG | DIASTOLIC BLOOD PRESSURE: 86 MMHG | BODY MASS INDEX: 28.29 KG/M2 | HEART RATE: 99 BPM

## 2024-03-26 DIAGNOSIS — O36.63X0 EXCESSIVE FETAL GROWTH AFFECTING MANAGEMENT OF PREGNANCY IN THIRD TRIMESTER, SINGLE OR UNSPECIFIED FETUS: ICD-10-CM

## 2024-03-26 DIAGNOSIS — Z34.03 PRIMIGRAVIDA, THIRD TRIMESTER: ICD-10-CM

## 2024-03-26 DIAGNOSIS — O24.414 INSULIN CONTROLLED GESTATIONAL DIABETES MELLITUS (GDM) IN THIRD TRIMESTER: Primary | ICD-10-CM

## 2024-03-26 PROCEDURE — 99213 OFFICE O/P EST LOW 20 MIN: CPT | Performed by: OBSTETRICS & GYNECOLOGY

## 2024-03-26 PROCEDURE — 76818 FETAL BIOPHYS PROFILE W/NST: CPT | Performed by: OBSTETRICS & GYNECOLOGY

## 2024-03-26 PROCEDURE — 81002 URINALYSIS NONAUTO W/O SCOPE: CPT | Performed by: OBSTETRICS & GYNECOLOGY

## 2024-03-26 NOTE — PROGRESS NOTES
Pt here for follow up.   Pt denies bleeding/cramping/lof.  Pt states good fetal movement.  Pt has not updated her sugar log. She states that her sugars have been good and consistent.   
abnormal pap smears.   Negative for sexually transmitted diseases.   Negative for cervical LEEP / conization /cryosurgery.    Negative for uterine surgery.   Negative for ovarian or tubal surgery.     Past Medical History:   Diagnosis Date    Anxiety     Fibroadenoma        Past Surgical History:   Procedure Laterality Date    BREAST BIOPSY       No Known Allergies      Current Outpatient Medications:     insulin NPH (HUMULIN N KWIKPEN) 100 UNIT/ML injection pen, Inject 8 Units into the skin nightly May need up to 40 units a day, Disp: 5 Adjustable Dose Pre-filled Pen Syringe, Rfl: 0    glucose monitoring kit, 1 kit by Does not apply route 4 times daily Check sugar in the morning when you wake up and 2 hrs after meals, Disp: 1 kit, Rfl: 0    blood glucose monitor strips, Test 4 times a day & as needed for symptoms of irregular blood glucose. Dispense sufficient amount for indicated testing frequency plus additional to accommodate PRN testing needs., Disp: 200 strip, Rfl: 5    Lancets MISC, 1 each by Does not apply route in the morning, at noon, in the evening, and at bedtime, Disp: 200 each, Rfl: 5    Prenatal Vit-Fe Fumarate-FA (PRENATAL VITAMIN) 27-0.8 MG TABS, Take 1 tablet by mouth daily, Disp: 90 tablet, Rfl: 4    Social History     Tobacco Use    Smoking status: Never    Smokeless tobacco: Never   Substance Use Topics    Alcohol use: Never     FAMILY MEDICAL HISTORY:   Negative for congenital abnormalities, autism, genetic disease and mental retardation, not listed above.     Review of Systems:   CONSTITUTIONAL : No fever, no chills   HEENT : No headache, no visual changes, no rhinorrhea, no sore throat   CARDIOVASCULAR : No pain, no palpitations, no edema   RESPIRATORY : No pain, no shortness of breath   GASTROINTESTINAL : No N/V, no D/C, no abdominal pain   GENITOURINARY : No dysuria, hematuria and no incontinence   MUSCULOSKELETAL : No myalgia, No back pain  NEUROLOGICAL : No numbness, no tingling, no

## 2024-03-26 NOTE — PATIENT INSTRUCTIONS
Please arrive for your scheduled appointment at least 15 minutes early with your actual insurance card+ a photo ID. Also if you need any refills ordered or have questions, it may take up 48 hours to reply. Please allow ample time for your refills. Call me when you use last refill. Thank you for your cooperation. You might be having an NST at your next appt. Please eat a large snack or breakfast before coming to office. Thank youCall your primary obstetrician with bleeding, leaking of fluid, abdominal tenderness, headache, blurry vision, epigastric pain and increased urinary frequency.If you are experiencing an emergency and need immediate help, call 911 or go to go emergency room or labor and delivery. Do kick counts after dinner. Call your primary obstetrician if less than 10 kicks in 2 hours after dinner.     Call your primary obstetrician with bleeding, leaking of fluid, abdominal tenderness, headache, blurry vision, epigastric pain and increased urinary frequency.if you are sick, not feeling well or have an infectious process going on please reschedule your appointment by calling [phone]. Also if any family members are not feeling well, please do not bring them to your appointment. We appreciate your cooperation. We are doing this in order to protect our pregnant mothers+ their babies.if you are sick, not feeling well or have an infectious process going on please reschedule your appointment by calling [phone]. Also if any family members are not feeling well, please do not bring them to your appointment. We appreciate your cooperation. We are doing this in order to protect our pregnant mothers+ their babies.

## 2024-03-28 ENCOUNTER — ROUTINE PRENATAL (OUTPATIENT)
Dept: OBGYN | Age: 34
End: 2024-03-28
Payer: COMMERCIAL

## 2024-03-28 VITALS
HEART RATE: 107 BPM | WEIGHT: 174.1 LBS | BODY MASS INDEX: 28.1 KG/M2 | SYSTOLIC BLOOD PRESSURE: 114 MMHG | DIASTOLIC BLOOD PRESSURE: 82 MMHG

## 2024-03-28 DIAGNOSIS — Z3A.38 38 WEEKS GESTATION OF PREGNANCY: ICD-10-CM

## 2024-03-28 DIAGNOSIS — Z34.93 ENCOUNTER FOR SUPERVISION OF NORMAL PREGNANCY IN THIRD TRIMESTER, UNSPECIFIED GRAVIDITY: Primary | ICD-10-CM

## 2024-03-28 LAB
GLUCOSE URINE, POC: NEGATIVE
PROTEIN UA: NEGATIVE

## 2024-03-28 PROCEDURE — 0502F SUBSEQUENT PRENATAL CARE: CPT | Performed by: OBSTETRICS & GYNECOLOGY

## 2024-03-28 PROCEDURE — 81002 URINALYSIS NONAUTO W/O SCOPE: CPT | Performed by: OBSTETRICS & GYNECOLOGY

## 2024-03-28 PROCEDURE — 99213 OFFICE O/P EST LOW 20 MIN: CPT | Performed by: OBSTETRICS & GYNECOLOGY

## 2024-03-28 NOTE — PROGRESS NOTES
SUBJECTIVE:   33 y.o.  female here for routine OB appointment.    +FM. No lof, vb, ctx.  Declines vaccines.   Sugars controlled on 8 units of lantus.   Discussed PIH and PTL precautions.   Hgb 9.3. pt taking oral iron.   GBS negative.   Growth 95th percentile, AC 99th percentile. Baby 8 lbs 2 week ago.   F/u 1 wks.   IOL 3/29/24 at 0700.  .  OB History    Para Term  AB Living   1             SAB IAB Ectopic Molar Multiple Live Births                    # Outcome Date GA Lbr Ab/2nd Weight Sex Delivery Anes PTL Lv   1 Current                Past Medical History:   Diagnosis Date    Anxiety     Fibroadenoma         Past Surgical History:   Procedure Laterality Date    BREAST BIOPSY          Family History   Problem Relation Age of Onset    Diabetes Mother     Diabetes Father     Pervasive Developmental Disorders  Maternal Aunt         developmental disability    Other Maternal Cousin         developmental disability        Social History       Tobacco History       Smoking Status  Never      Smokeless Tobacco Use  Never              Alcohol History       Alcohol Use Status  Never              Drug Use       Drug Use Status  Never              Sexual Activity       Sexually Active  Yes Partners  Male                      Current Outpatient Medications:     insulin NPH (HUMULIN N KWIKPEN) 100 UNIT/ML injection pen, Inject 8 Units into the skin nightly May need up to 40 units a day, Disp: 5 Adjustable Dose Pre-filled Pen Syringe, Rfl: 0    glucose monitoring kit, 1 kit by Does not apply route 4 times daily Check sugar in the morning when you wake up and 2 hrs after meals, Disp: 1 kit, Rfl: 0    blood glucose monitor strips, Test 4 times a day & as needed for symptoms of irregular blood glucose. Dispense sufficient amount for indicated testing frequency plus additional to accommodate PRN testing needs., Disp: 200 strip, Rfl: 5    Lancets MISC, 1 each by Does not apply route in the morning, at noon, in

## 2024-03-29 ENCOUNTER — APPOINTMENT (OUTPATIENT)
Dept: LABOR AND DELIVERY | Age: 34
End: 2024-03-29
Payer: COMMERCIAL

## 2024-03-29 ENCOUNTER — HOSPITAL ENCOUNTER (INPATIENT)
Age: 34
LOS: 4 days | Discharge: HOME | End: 2024-04-02
Attending: OBSTETRICS & GYNECOLOGY | Admitting: STUDENT IN AN ORGANIZED HEALTH CARE EDUCATION/TRAINING PROGRAM
Payer: COMMERCIAL

## 2024-03-29 DIAGNOSIS — R10.9 ACUTE POSTOPERATIVE ABDOMINAL PAIN: Primary | ICD-10-CM

## 2024-03-29 DIAGNOSIS — G89.18 ACUTE POSTOPERATIVE ABDOMINAL PAIN: Primary | ICD-10-CM

## 2024-03-29 PROBLEM — Z3A.39 39 WEEKS GESTATION OF PREGNANCY: Status: ACTIVE | Noted: 2024-03-29

## 2024-03-29 PROBLEM — Z34.90 ENCOUNTER FOR INDUCTION OF LABOR: Status: ACTIVE | Noted: 2024-03-29

## 2024-03-29 LAB
AMPHET UR QL SCN: NEGATIVE
BARBITURATES UR QL SCN: NEGATIVE
BENZODIAZ UR QL: NEGATIVE
BUPRENORPHINE UR QL: NEGATIVE
CANNABINOIDS UR QL SCN: NEGATIVE
COCAINE UR QL SCN: NEGATIVE
ERYTHROCYTE [DISTWIDTH] IN BLOOD BY AUTOMATED COUNT: 14.6 % (ref 11.5–15)
FENTANYL UR QL: NEGATIVE
HCT VFR BLD AUTO: 31 % (ref 34–48)
HGB BLD-MCNC: 9.5 G/DL (ref 11.5–15.5)
MCH RBC QN AUTO: 25.7 PG (ref 26–35)
MCHC RBC AUTO-ENTMCNC: 30.6 G/DL (ref 32–34.5)
MCV RBC AUTO: 83.8 FL (ref 80–99.9)
METHADONE UR QL: NEGATIVE
OPIATES UR QL SCN: NEGATIVE
OXYCODONE UR QL SCN: NEGATIVE
PCP UR QL SCN: NEGATIVE
PLATELET, FLUORESCENCE: 171 K/UL (ref 130–450)
PMV BLD AUTO: 13.2 FL (ref 7–12)
RBC # BLD AUTO: 3.7 M/UL (ref 3.5–5.5)
TEST INFORMATION: NORMAL
WBC OTHER # BLD: 8.6 K/UL (ref 4.5–11.5)

## 2024-03-29 PROCEDURE — 1220000001 HC SEMI PRIVATE L&D R&B

## 2024-03-29 PROCEDURE — 80307 DRUG TEST PRSMV CHEM ANLYZR: CPT

## 2024-03-29 PROCEDURE — 6360000002 HC RX W HCPCS: Performed by: OBSTETRICS & GYNECOLOGY

## 2024-03-29 PROCEDURE — 86900 BLOOD TYPING SEROLOGIC ABO: CPT

## 2024-03-29 PROCEDURE — 86850 RBC ANTIBODY SCREEN: CPT

## 2024-03-29 PROCEDURE — 2580000003 HC RX 258: Performed by: OBSTETRICS & GYNECOLOGY

## 2024-03-29 PROCEDURE — 86923 COMPATIBILITY TEST ELECTRIC: CPT

## 2024-03-29 PROCEDURE — 10907ZC DRAINAGE OF AMNIOTIC FLUID, THERAPEUTIC FROM PRODUCTS OF CONCEPTION, VIA NATURAL OR ARTIFICIAL OPENING: ICD-10-PCS | Performed by: STUDENT IN AN ORGANIZED HEALTH CARE EDUCATION/TRAINING PROGRAM

## 2024-03-29 PROCEDURE — 3E033VJ INTRODUCTION OF OTHER HORMONE INTO PERIPHERAL VEIN, PERCUTANEOUS APPROACH: ICD-10-PCS | Performed by: STUDENT IN AN ORGANIZED HEALTH CARE EDUCATION/TRAINING PROGRAM

## 2024-03-29 PROCEDURE — 85027 COMPLETE CBC AUTOMATED: CPT

## 2024-03-29 PROCEDURE — 86901 BLOOD TYPING SEROLOGIC RH(D): CPT

## 2024-03-29 RX ORDER — TRANEXAMIC ACID 10 MG/ML
1000 INJECTION, SOLUTION INTRAVENOUS
Status: ACTIVE | OUTPATIENT
Start: 2024-03-29 | End: 2024-03-30

## 2024-03-29 RX ORDER — SODIUM CHLORIDE, SODIUM LACTATE, POTASSIUM CHLORIDE, AND CALCIUM CHLORIDE .6; .31; .03; .02 G/100ML; G/100ML; G/100ML; G/100ML
1000 INJECTION, SOLUTION INTRAVENOUS PRN
Status: DISCONTINUED | OUTPATIENT
Start: 2024-03-29 | End: 2024-04-02 | Stop reason: HOSPADM

## 2024-03-29 RX ORDER — ONDANSETRON 4 MG/1
4 TABLET, ORALLY DISINTEGRATING ORAL EVERY 6 HOURS PRN
Status: DISCONTINUED | OUTPATIENT
Start: 2024-03-29 | End: 2024-03-31

## 2024-03-29 RX ORDER — ERYTHROMYCIN 5 MG/G
OINTMENT OPHTHALMIC
Status: DISPENSED
Start: 2024-03-29 | End: 2024-03-30

## 2024-03-29 RX ORDER — ONDANSETRON 2 MG/ML
4 INJECTION INTRAMUSCULAR; INTRAVENOUS EVERY 6 HOURS PRN
Status: DISCONTINUED | OUTPATIENT
Start: 2024-03-29 | End: 2024-03-31

## 2024-03-29 RX ORDER — SODIUM CHLORIDE, SODIUM LACTATE, POTASSIUM CHLORIDE, AND CALCIUM CHLORIDE .6; .31; .03; .02 G/100ML; G/100ML; G/100ML; G/100ML
500 INJECTION, SOLUTION INTRAVENOUS PRN
Status: DISCONTINUED | OUTPATIENT
Start: 2024-03-29 | End: 2024-04-02 | Stop reason: HOSPADM

## 2024-03-29 RX ORDER — SODIUM CHLORIDE, SODIUM LACTATE, POTASSIUM CHLORIDE, CALCIUM CHLORIDE 600; 310; 30; 20 MG/100ML; MG/100ML; MG/100ML; MG/100ML
INJECTION, SOLUTION INTRAVENOUS CONTINUOUS
Status: DISCONTINUED | OUTPATIENT
Start: 2024-03-29 | End: 2024-03-30 | Stop reason: SDUPTHER

## 2024-03-29 RX ORDER — LIDOCAINE HYDROCHLORIDE 10 MG/ML
INJECTION, SOLUTION INFILTRATION; PERINEURAL
Status: DISPENSED
Start: 2024-03-29 | End: 2024-03-30

## 2024-03-29 RX ORDER — MISOPROSTOL 200 UG/1
400 TABLET ORAL PRN
Status: DISCONTINUED | OUTPATIENT
Start: 2024-03-29 | End: 2024-04-02 | Stop reason: HOSPADM

## 2024-03-29 RX ORDER — ACETAMINOPHEN 650 MG
TABLET, EXTENDED RELEASE ORAL
Status: DISPENSED
Start: 2024-03-29 | End: 2024-03-30

## 2024-03-29 RX ORDER — PHYTONADIONE 1 MG/.5ML
INJECTION, EMULSION INTRAMUSCULAR; INTRAVENOUS; SUBCUTANEOUS
Status: DISPENSED
Start: 2024-03-29 | End: 2024-03-30

## 2024-03-29 RX ORDER — METHYLERGONOVINE MALEATE 0.2 MG/ML
200 INJECTION INTRAVENOUS PRN
Status: DISCONTINUED | OUTPATIENT
Start: 2024-03-29 | End: 2024-04-02 | Stop reason: HOSPADM

## 2024-03-29 RX ORDER — CARBOPROST TROMETHAMINE 250 UG/ML
250 INJECTION, SOLUTION INTRAMUSCULAR PRN
Status: DISCONTINUED | OUTPATIENT
Start: 2024-03-29 | End: 2024-04-02 | Stop reason: HOSPADM

## 2024-03-29 RX ORDER — TERBUTALINE SULFATE 1 MG/ML
0.25 INJECTION, SOLUTION SUBCUTANEOUS
Status: ACTIVE | OUTPATIENT
Start: 2024-03-29 | End: 2024-03-30

## 2024-03-29 RX ADMIN — SODIUM CHLORIDE, POTASSIUM CHLORIDE, SODIUM LACTATE AND CALCIUM CHLORIDE: 600; 310; 30; 20 INJECTION, SOLUTION INTRAVENOUS at 21:38

## 2024-03-29 RX ADMIN — Medication 1 MILLI-UNITS/MIN: at 22:26

## 2024-03-30 ENCOUNTER — ANESTHESIA EVENT (OUTPATIENT)
Dept: LABOR AND DELIVERY | Age: 34
End: 2024-03-30
Payer: COMMERCIAL

## 2024-03-30 ENCOUNTER — ANESTHESIA (OUTPATIENT)
Dept: LABOR AND DELIVERY | Age: 34
End: 2024-03-30
Payer: COMMERCIAL

## 2024-03-30 LAB
GLUCOSE BLD-MCNC: 106 MG/DL (ref 74–99)
GLUCOSE BLD-MCNC: 82 MG/DL (ref 74–99)
GLUCOSE BLD-MCNC: 87 MG/DL (ref 74–99)
GLUCOSE BLD-MCNC: 87 MG/DL (ref 74–99)
GLUCOSE BLD-MCNC: 93 MG/DL (ref 74–99)

## 2024-03-30 PROCEDURE — 2580000003 HC RX 258: Performed by: OBSTETRICS & GYNECOLOGY

## 2024-03-30 PROCEDURE — 82962 GLUCOSE BLOOD TEST: CPT

## 2024-03-30 PROCEDURE — 1220000001 HC SEMI PRIVATE L&D R&B

## 2024-03-30 PROCEDURE — 2500000003 HC RX 250 WO HCPCS: Performed by: ANESTHESIOLOGY

## 2024-03-30 PROCEDURE — 7100000000 HC PACU RECOVERY - FIRST 15 MIN: Performed by: STUDENT IN AN ORGANIZED HEALTH CARE EDUCATION/TRAINING PROGRAM

## 2024-03-30 PROCEDURE — 2580000003 HC RX 258: Performed by: STUDENT IN AN ORGANIZED HEALTH CARE EDUCATION/TRAINING PROGRAM

## 2024-03-30 PROCEDURE — 2580000003 HC RX 258: Performed by: NURSE ANESTHETIST, CERTIFIED REGISTERED

## 2024-03-30 PROCEDURE — 59510 CESAREAN DELIVERY: CPT | Performed by: STUDENT IN AN ORGANIZED HEALTH CARE EDUCATION/TRAINING PROGRAM

## 2024-03-30 PROCEDURE — 51701 INSERT BLADDER CATHETER: CPT

## 2024-03-30 PROCEDURE — 3700000001 HC ADD 15 MINUTES (ANESTHESIA): Performed by: STUDENT IN AN ORGANIZED HEALTH CARE EDUCATION/TRAINING PROGRAM

## 2024-03-30 PROCEDURE — 6360000002 HC RX W HCPCS: Performed by: NURSE ANESTHETIST, CERTIFIED REGISTERED

## 2024-03-30 PROCEDURE — 3609079900 HC CESAREAN SECTION: Performed by: STUDENT IN AN ORGANIZED HEALTH CARE EDUCATION/TRAINING PROGRAM

## 2024-03-30 PROCEDURE — 88307 TISSUE EXAM BY PATHOLOGIST: CPT

## 2024-03-30 PROCEDURE — 6360000002 HC RX W HCPCS: Performed by: STUDENT IN AN ORGANIZED HEALTH CARE EDUCATION/TRAINING PROGRAM

## 2024-03-30 PROCEDURE — 6360000002 HC RX W HCPCS: Performed by: OBSTETRICS & GYNECOLOGY

## 2024-03-30 PROCEDURE — APPNB15 APP NON BILLABLE TIME 0-15 MINS: Performed by: ADVANCED PRACTICE MIDWIFE

## 2024-03-30 PROCEDURE — 3700000025 EPIDURAL BLOCK: Performed by: ANESTHESIOLOGY

## 2024-03-30 PROCEDURE — 59514 CESAREAN DELIVERY ONLY: CPT

## 2024-03-30 PROCEDURE — 7100000001 HC PACU RECOVERY - ADDTL 15 MIN: Performed by: STUDENT IN AN ORGANIZED HEALTH CARE EDUCATION/TRAINING PROGRAM

## 2024-03-30 PROCEDURE — 6370000000 HC RX 637 (ALT 250 FOR IP)

## 2024-03-30 PROCEDURE — 2709999900 HC NON-CHARGEABLE SUPPLY: Performed by: STUDENT IN AN ORGANIZED HEALTH CARE EDUCATION/TRAINING PROGRAM

## 2024-03-30 PROCEDURE — 3700000000 HC ANESTHESIA ATTENDED CARE: Performed by: STUDENT IN AN ORGANIZED HEALTH CARE EDUCATION/TRAINING PROGRAM

## 2024-03-30 RX ORDER — CITRIC ACID/SODIUM CITRATE 334-500MG
SOLUTION, ORAL ORAL
Status: COMPLETED
Start: 2024-03-30 | End: 2024-03-30

## 2024-03-30 RX ORDER — SODIUM CHLORIDE, SODIUM LACTATE, POTASSIUM CHLORIDE, AND CALCIUM CHLORIDE .6; .31; .03; .02 G/100ML; G/100ML; G/100ML; G/100ML
1000 INJECTION, SOLUTION INTRAVENOUS ONCE
Status: DISCONTINUED | OUTPATIENT
Start: 2024-03-30 | End: 2024-04-02 | Stop reason: HOSPADM

## 2024-03-30 RX ORDER — CEFAZOLIN 2 G/1
INJECTION, POWDER, FOR SOLUTION INTRAMUSCULAR; INTRAVENOUS
Status: DISPENSED
Start: 2024-03-30 | End: 2024-03-31

## 2024-03-30 RX ORDER — EPHEDRINE SULFATE/0.9% NACL/PF 25 MG/5 ML
5 SYRINGE (ML) INTRAVENOUS ONCE
Status: COMPLETED | OUTPATIENT
Start: 2024-03-31 | End: 2024-03-30

## 2024-03-30 RX ORDER — CITRIC ACID/SODIUM CITRATE 334-500MG
30 SOLUTION, ORAL ORAL ONCE
Status: COMPLETED | OUTPATIENT
Start: 2024-03-30 | End: 2024-03-30

## 2024-03-30 RX ORDER — ONDANSETRON 2 MG/ML
INJECTION INTRAMUSCULAR; INTRAVENOUS PRN
Status: DISCONTINUED | OUTPATIENT
Start: 2024-03-30 | End: 2024-03-30 | Stop reason: SDUPTHER

## 2024-03-30 RX ORDER — BUPIVACAINE HYDROCHLORIDE 2.5 MG/ML
INJECTION, SOLUTION EPIDURAL; INFILTRATION; INTRACAUDAL PRN
Status: DISCONTINUED | OUTPATIENT
Start: 2024-03-30 | End: 2024-03-30 | Stop reason: SDUPTHER

## 2024-03-30 RX ORDER — EPHEDRINE SULFATE/0.9% NACL/PF 25 MG/5 ML
10 SYRINGE (ML) INTRAVENOUS ONCE
Status: COMPLETED | OUTPATIENT
Start: 2024-03-31 | End: 2024-03-30

## 2024-03-30 RX ORDER — MEPERIDINE HYDROCHLORIDE 25 MG/ML
25 INJECTION INTRAMUSCULAR; INTRAVENOUS; SUBCUTANEOUS
Status: DISPENSED | OUTPATIENT
Start: 2024-03-30 | End: 2024-04-01

## 2024-03-30 RX ORDER — NALOXONE HYDROCHLORIDE 0.4 MG/ML
INJECTION, SOLUTION INTRAMUSCULAR; INTRAVENOUS; SUBCUTANEOUS PRN
Status: DISCONTINUED | OUTPATIENT
Start: 2024-03-30 | End: 2024-03-31

## 2024-03-30 RX ORDER — ONDANSETRON 2 MG/ML
4 INJECTION INTRAMUSCULAR; INTRAVENOUS EVERY 6 HOURS PRN
Status: DISCONTINUED | OUTPATIENT
Start: 2024-03-30 | End: 2024-03-30 | Stop reason: SDUPTHER

## 2024-03-30 RX ORDER — FENTANYL CITRATE 50 UG/ML
INJECTION, SOLUTION INTRAMUSCULAR; INTRAVENOUS PRN
Status: DISCONTINUED | OUTPATIENT
Start: 2024-03-30 | End: 2024-03-30 | Stop reason: SDUPTHER

## 2024-03-30 RX ORDER — MORPHINE SULFATE 1 MG/ML
INJECTION, SOLUTION EPIDURAL; INTRATHECAL; INTRAVENOUS PRN
Status: DISCONTINUED | OUTPATIENT
Start: 2024-03-30 | End: 2024-03-30 | Stop reason: SDUPTHER

## 2024-03-30 RX ORDER — PHENYLEPHRINE HCL IN 0.9% NACL 1 MG/10 ML
SYRINGE (ML) INTRAVENOUS PRN
Status: DISCONTINUED | OUTPATIENT
Start: 2024-03-30 | End: 2024-03-30 | Stop reason: SDUPTHER

## 2024-03-30 RX ORDER — SODIUM CHLORIDE, SODIUM LACTATE, POTASSIUM CHLORIDE, CALCIUM CHLORIDE 600; 310; 30; 20 MG/100ML; MG/100ML; MG/100ML; MG/100ML
INJECTION, SOLUTION INTRAVENOUS CONTINUOUS
Status: DISCONTINUED | OUTPATIENT
Start: 2024-03-30 | End: 2024-03-31

## 2024-03-30 RX ORDER — SODIUM CHLORIDE, SODIUM LACTATE, POTASSIUM CHLORIDE, CALCIUM CHLORIDE 600; 310; 30; 20 MG/100ML; MG/100ML; MG/100ML; MG/100ML
INJECTION, SOLUTION INTRAVENOUS CONTINUOUS PRN
Status: DISCONTINUED | OUTPATIENT
Start: 2024-03-30 | End: 2024-03-30 | Stop reason: SDUPTHER

## 2024-03-30 RX ORDER — CHLOROPROCAINE HYDROCHLORIDE 30 MG/ML
INJECTION, SOLUTION EPIDURAL; INFILTRATION; INTRACAUDAL; PERINEURAL PRN
Status: DISCONTINUED | OUTPATIENT
Start: 2024-03-30 | End: 2024-03-30 | Stop reason: SDUPTHER

## 2024-03-30 RX ORDER — WATER 10 ML/10ML
INJECTION INTRAMUSCULAR; INTRAVENOUS; SUBCUTANEOUS
Status: DISPENSED
Start: 2024-03-30 | End: 2024-03-31

## 2024-03-30 RX ADMIN — Medication 15 ML/HR: at 17:25

## 2024-03-30 RX ADMIN — FENTANYL CITRATE 50 MCG: 50 INJECTION, SOLUTION INTRAMUSCULAR; INTRAVENOUS at 22:12

## 2024-03-30 RX ADMIN — ONDANSETRON 4 MG: 2 INJECTION INTRAMUSCULAR; INTRAVENOUS at 22:31

## 2024-03-30 RX ADMIN — SODIUM CHLORIDE, POTASSIUM CHLORIDE, SODIUM LACTATE AND CALCIUM CHLORIDE: 600; 310; 30; 20 INJECTION, SOLUTION INTRAVENOUS at 17:57

## 2024-03-30 RX ADMIN — MEPERIDINE HYDROCHLORIDE 25 MG: 25 INJECTION, SOLUTION INTRAMUSCULAR; INTRAVENOUS; SUBCUTANEOUS at 08:53

## 2024-03-30 RX ADMIN — SODIUM CHLORIDE, POTASSIUM CHLORIDE, SODIUM LACTATE AND CALCIUM CHLORIDE: 600; 310; 30; 20 INJECTION, SOLUTION INTRAVENOUS at 22:17

## 2024-03-30 RX ADMIN — CHLOROPROCAINE HYDROCHLORIDE 10 ML: 30 INJECTION, SOLUTION EPIDURAL; INFILTRATION; INTRACAUDAL; PERINEURAL at 22:12

## 2024-03-30 RX ADMIN — Medication 200 MCG: at 22:26

## 2024-03-30 RX ADMIN — Medication 909 ML/HR: at 22:31

## 2024-03-30 RX ADMIN — Medication 200 MCG: at 22:44

## 2024-03-30 RX ADMIN — CHLOROPROCAINE HYDROCHLORIDE 5 ML: 30 INJECTION, SOLUTION EPIDURAL; INFILTRATION; INTRACAUDAL; PERINEURAL at 22:32

## 2024-03-30 RX ADMIN — Medication 200 MCG: at 22:42

## 2024-03-30 RX ADMIN — Medication 15 ML/HR: at 10:30

## 2024-03-30 RX ADMIN — MORPHINE SULFATE 2 MG: 1 INJECTION, SOLUTION EPIDURAL; INTRATHECAL; INTRAVENOUS at 22:32

## 2024-03-30 RX ADMIN — BUPIVACAINE HYDROCHLORIDE 8 ML: 2.5 INJECTION, SOLUTION EPIDURAL; INFILTRATION; INTRACAUDAL; PERINEURAL at 17:54

## 2024-03-30 RX ADMIN — SODIUM CITRATE AND CITRIC ACID MONOHYDRATE 30 ML: 500; 334 SOLUTION ORAL at 21:00

## 2024-03-30 RX ADMIN — EPHEDRINE SULFATE 5 MG: 5 INJECTION INTRAVENOUS at 23:20

## 2024-03-30 RX ADMIN — FENTANYL CITRATE 50 MCG: 50 INJECTION, SOLUTION INTRAMUSCULAR; INTRAVENOUS at 22:17

## 2024-03-30 RX ADMIN — FENTANYL CITRATE 100 MCG: 50 INJECTION, SOLUTION INTRAMUSCULAR; INTRAVENOUS at 17:54

## 2024-03-30 RX ADMIN — EPHEDRINE SULFATE 10 MG: 5 INJECTION INTRAVENOUS at 23:26

## 2024-03-30 RX ADMIN — WATER 2000 MG: 1 INJECTION INTRAMUSCULAR; INTRAVENOUS; SUBCUTANEOUS at 22:00

## 2024-03-30 RX ADMIN — Medication 200 MCG: at 22:32

## 2024-03-30 RX ADMIN — SODIUM CHLORIDE, POTASSIUM CHLORIDE, SODIUM LACTATE AND CALCIUM CHLORIDE: 600; 310; 30; 20 INJECTION, SOLUTION INTRAVENOUS at 09:57

## 2024-03-30 RX ADMIN — CHLOROPROCAINE HYDROCHLORIDE 10 ML: 30 INJECTION, SOLUTION EPIDURAL; INFILTRATION; INTRACAUDAL; PERINEURAL at 22:17

## 2024-03-30 RX ADMIN — Medication 30 ML: at 21:00

## 2024-03-30 RX ADMIN — ONDANSETRON 4 MG: 2 INJECTION INTRAMUSCULAR; INTRAVENOUS at 06:50

## 2024-03-30 RX ADMIN — SODIUM CHLORIDE, POTASSIUM CHLORIDE, SODIUM LACTATE AND CALCIUM CHLORIDE: 600; 310; 30; 20 INJECTION, SOLUTION INTRAVENOUS at 10:05

## 2024-03-30 RX ADMIN — Medication 200 MCG: at 22:39

## 2024-03-30 RX ADMIN — SODIUM CHLORIDE, POTASSIUM CHLORIDE, SODIUM LACTATE AND CALCIUM CHLORIDE: 600; 310; 30; 20 INJECTION, SOLUTION INTRAVENOUS at 11:03

## 2024-03-30 ASSESSMENT — PAIN DESCRIPTION - DESCRIPTORS: DESCRIPTORS: BURNING;CRAMPING;CRUSHING

## 2024-03-30 ASSESSMENT — PAIN DESCRIPTION - ORIENTATION: ORIENTATION: MID;LOWER

## 2024-03-30 ASSESSMENT — PAIN DESCRIPTION - LOCATION: LOCATION: ABDOMEN;BACK

## 2024-03-30 ASSESSMENT — PAIN SCALES - GENERAL: PAINLEVEL_OUTOF10: 6

## 2024-03-30 NOTE — PROGRESS NOTES
Dr. Heredia at bedside, SVE 2/70/-1, IOL plan reviewed with patient. New orders received for pitocin at IOL, pt may have an epidural upon request.

## 2024-03-30 NOTE — H&P
Department of Obstetrics and Gynecology  Labor and Delivery  History & Physical    Patient:  Alyse Art     Admit Date:  3/29/2024  9:17 PM  Medical Record Number:  [id number]    CHIEF COMPLAINT:  IUP at 39 weeks with IRGDM and suspected macrosomia with a BS=8 for Pitocin IOL    PROBLEM LIST:     Patient Active Problem List   Diagnosis    Primigravida, third trimester    Insulin controlled gestational diabetes mellitus (GDM) in third trimester    Excessive fetal growth affecting management of pregnancy in third trimester    Encounter for induction of labor    39 weeks gestation of pregnancy           HISTORY OF PRESENT ILLNESS:    The patient is a 33 y.o. W female  at 39w0d.  Patient presents with a h/o Insulin Requiring GDM A2 and suspected fetal macrosomia with an EFW of  8# (3630g) 95% with an AC 98% at 36 6/7 weeks. By extrapolation the EFW is expected to be near 4100 g.  Patient states that over the last month, since starting insulin her blood sugars are under good control.  Prior to that she had a hard time getting her fastings regulated.  She denies contractions, leaking fluid and vaginal bleeding.  She has no symptoms of UTI or PIH.  There is good fetal movement.    ESTIMATED DUE DATE: Estimated Date of Delivery: 24    PRENATAL CARE:  Complicated by: GDM A2, insulin requiring and suspected fetal macrosomia  GBS: negative    Past OB History  OB History          1    Para        Term                AB        Living             SAB        IAB        Ectopic        Molar        Multiple        Live Births                    Past Medical History:        Diagnosis Date    Anxiety     Fibroadenoma        Past Surgical History:        Procedure Laterality Date    BREAST BIOPSY         Allergies:  Patient has no known allergies.    Social History:    Social History     Socioeconomic History    Marital status:      Spouse name: Not on file    Number of children: Not on file    Years of

## 2024-03-30 NOTE — PROGRESS NOTES
Call to Sahil SMITH. SVE 4-5/80/-1 posterior. Forebag felt. IUPC rising resting tone, uterus soft resting tone after contractions

## 2024-03-30 NOTE — PROGRESS NOTES
Anesthesia called. Patient stating there is pain in lower left abdominal area. Anesthesia states they will be out to assess patient discomfort

## 2024-03-30 NOTE — PROGRESS NOTES
IUPC disconnected until forebag is broken. [place] will be placed back on patient     IV infiltrated. IV team consult placed

## 2024-03-30 NOTE — PROGRESS NOTES
Received sign out. Introduced myself to patient and her . Reviewed again the risks of SD given suspected macrosomia. Discussed no operative vaginal delivery given this circumstance. Her leopold is approx 8 lbs. She is currently 4cm dilated and ruptured. Adequate contractions. Continue pit. FS q4h in early labor and q2h in active labor.

## 2024-03-30 NOTE — PROGRESS NOTES
Call to Héctor SMITH. Updated on patient case. SVE 9/90/0. Ctx q2-3.5min, on 4 of pitocin. [place] catheter removed at 1830. Patient repositioned and resting at this time.

## 2024-03-30 NOTE — PROGRESS NOTES
Dr Bell house officer at bedside.   New orders for blood sugar checks q4h until 6 cm. Once 6 cm, blood sugar q2h

## 2024-03-30 NOTE — ANESTHESIA PRE PROCEDURE
Department of Anesthesiology  Preprocedure Note       Name:  Alyse Art   Age:  33 y.o.  :  1990                                          MRN:  [id number]         Date:  3/30/2024      Surgeon: * No surgeons listed *    Procedure: * No procedures listed *    Medications prior to admission:   Prior to Admission medications    Medication Sig Start Date End Date Taking? Authorizing Provider   insulin NPH (HUMULIN N KWIKPEN) 100 UNIT/ML injection pen Inject 8 Units into the skin nightly May need up to 40 units a day 24   Johnathan Reynolds MD   glucose monitoring kit 1 kit by Does not apply route 4 times daily Check sugar in the morning when you wake up and 2 hrs after meals 24   Guillermina Restrepo DO   blood glucose monitor strips Test 4 times a day & as needed for symptoms of irregular blood glucose. Dispense sufficient amount for indicated testing frequency plus additional to accommodate PRN testing needs. 24   Guillermina Restrepo DO   Lancets MISC 1 each by Does not apply route in the morning, at noon, in the evening, and at bedtime 24   Guillermina Restrepo DO   Prenatal Vit-Fe Fumarate-FA (PRENATAL VITAMIN) 27-0.8 MG TABS Take 1 tablet by mouth daily 10/6/17   Valorie Lance MD       Current medications:    Current Facility-Administered Medications   Medication Dose Route Frequency Provider Last Rate Last Admin    meperidine (DEMEROL) injection 25 mg  25 mg IntraVENous Q3H PRN Shant Bell MD   25 mg at 24 0853    naloxone 0.4 mg in 10 mL sodium chloride syringe   IntraVENous PRN Cullen Navarro DO        fentaNYL 1.85mcg/ml and Bupivicaine 0.1% in 0.9% NS 135ml infusion (OB) epidural  15 mL/hr Epidural Continuous Cullen Navarro DO        lactated ringers IV soln infusion   IntraVENous Continuous Maksim Heredia  mL/hr at 24 0957 New Bag at 24 0957    lactated ringers bolus 500 mL  500 mL IntraVENous PRN Maksim Heredia MD        Or

## 2024-03-30 NOTE — PROGRESS NOTES
Call to Dr Bell, house officer to update on patient case. Patient comfortable with epidural. SVE 6/90/-2 midline. When sitting for epidural, patient states she felt gush of fluid. Time was 1035. Forebag not felt on vaginal exam. Pitocin has been off since IV infiltrated.   IUPC that was previously placed reconnected and tracing contractions q2-3.5min.     Orders to restart pitocin at 2 if contractions space

## 2024-03-30 NOTE — PROGRESS NOTES
Sahil SMITH at bedside. SVE 3-4/80/-1. AROM for clear scant fluid at 0713. IUPC placed at this time    New orders for demerol 25 q3h PRN if patient requests

## 2024-03-30 NOTE — ANESTHESIA PROCEDURE NOTES
Epidural Block    Patient location during procedure: OB  Start time: 3/30/2024 10:20 AM  End time: 3/30/2024 10:35 AM  Reason for block: labor epidural  Staffing  Performed by: Ant Beltran APRN - CRNA  Authorized by: Cullen Navarro DO    Epidural  Patient position: sitting  Prep: Betadine  Patient monitoring: cardiac monitor, continuous pulse ox and frequent blood pressure checks  Approach: midline  Location: L3-4  Injection technique: LISA saline  Provider prep: mask and sterile gloves  Needle  Needle type: Tuohy   Needle gauge: 18 G  Needle length: 3.5 in  Needle insertion depth: 4 cm  Catheter type: end hole  Catheter size: 20 G.  Catheter at skin depth: 10 cm  Test dose: negativeCatheter Secured: tegaderm and tape  Assessment  Hemodynamics: stable  Attempts: 1  Outcomes: uncomplicated and patient tolerated procedure well  Preanesthetic Checklist  Completed: patient identified, IV checked, site marked, risks and benefits discussed, surgical/procedural consents, equipment checked, pre-op evaluation, timeout performed, anesthesia consent given, oxygen available and monitors applied/VS acknowledged

## 2024-03-30 NOTE — PROGRESS NOTES
L&D PROGRESS NOTE:    Patient:  Alyse Art     Admit Date:  3/29/2024  9:17 PM  Medical Record Number:  [id number]   Today's Date: 3/30/2024    S: Dr requesting AROM and placement of IUPC.    O:   Vitals:    24 0409 24 0440 24 0508 24 0538   BP: (!) 108/59 114/66 (!) 107/59 (!) 109/59   Pulse: 86 65 85 73   Resp:       Temp:       TempSrc:       SpO2:       Weight:       Height:         FHR:  Reassuring.  Cervix:  3-4/80/-1.  TOCO:  3-5  On 18mu Pitocin    A:33 y.o.  female at 39w1d   Term pregnancy and Induced labor gestational diabetes induction   Patient Active Problem List   Diagnosis    Primigravida, third trimester    Insulin controlled gestational diabetes mellitus (GDM) in third trimester    Excessive fetal growth affecting management of pregnancy in third trimester    Encounter for induction of labor    39 weeks gestation of pregnancy     Fetal status: Reassuring  GBS: negative    P: AROM without difficulty using the [place]. Ruptured clear fluid.  IUPC placed posteriorly at 5 o clock to 45 cm line at introitus without difficulty.  IV bolus/O2/position changes prn  See orders per Maksim Price MD.    CHITO Dillon CNM,   3/30/2024 7:19 AM

## 2024-03-30 NOTE — PROGRESS NOTES
presents at 39w for a scheduled IOL due to LGA and GDM controlled with 8U of lantus nightly. Pt denies LOF, VB, and regular ctx's. Pt states +FM. Placed on EFM, call light within reach.

## 2024-03-30 NOTE — PROGRESS NOTES
Called to eval IUPC  VE 4-5/80/-1  IUPC intact tried to zero and id not correct.  Fore bag felt  Uterus soft between ctx  Will use toco and arom fore bag after epidural per patient request    CHITO Dillon - SARAH

## 2024-03-31 LAB
ERYTHROCYTE [DISTWIDTH] IN BLOOD BY AUTOMATED COUNT: 14.9 % (ref 11.5–15)
HCT VFR BLD AUTO: 22.8 % (ref 34–48)
HGB BLD-MCNC: 7.1 G/DL (ref 11.5–15.5)
MCH RBC QN AUTO: 25.7 PG (ref 26–35)
MCHC RBC AUTO-ENTMCNC: 31.1 G/DL (ref 32–34.5)
MCV RBC AUTO: 82.6 FL (ref 80–99.9)
PLATELET, FLUORESCENCE: 152 K/UL (ref 130–450)
PMV BLD AUTO: 13.3 FL (ref 7–12)
RBC # BLD AUTO: 2.76 M/UL (ref 3.5–5.5)
WBC OTHER # BLD: 17.1 K/UL (ref 4.5–11.5)

## 2024-03-31 PROCEDURE — 6360000002 HC RX W HCPCS

## 2024-03-31 PROCEDURE — 6360000002 HC RX W HCPCS: Performed by: STUDENT IN AN ORGANIZED HEALTH CARE EDUCATION/TRAINING PROGRAM

## 2024-03-31 PROCEDURE — 2580000003 HC RX 258: Performed by: STUDENT IN AN ORGANIZED HEALTH CARE EDUCATION/TRAINING PROGRAM

## 2024-03-31 PROCEDURE — 85027 COMPLETE CBC AUTOMATED: CPT

## 2024-03-31 PROCEDURE — 6360000002 HC RX W HCPCS: Performed by: ANESTHESIOLOGY

## 2024-03-31 PROCEDURE — 99024 POSTOP FOLLOW-UP VISIT: CPT | Performed by: STUDENT IN AN ORGANIZED HEALTH CARE EDUCATION/TRAINING PROGRAM

## 2024-03-31 PROCEDURE — 1220000000 HC SEMI PRIVATE OB R&B

## 2024-03-31 PROCEDURE — 2580000003 HC RX 258: Performed by: ANESTHESIOLOGY

## 2024-03-31 PROCEDURE — 6370000000 HC RX 637 (ALT 250 FOR IP): Performed by: STUDENT IN AN ORGANIZED HEALTH CARE EDUCATION/TRAINING PROGRAM

## 2024-03-31 RX ORDER — NALOXONE HYDROCHLORIDE 0.4 MG/ML
INJECTION, SOLUTION INTRAMUSCULAR; INTRAVENOUS; SUBCUTANEOUS PRN
Status: ACTIVE | OUTPATIENT
Start: 2024-03-31 | End: 2024-04-01

## 2024-03-31 RX ORDER — SODIUM CHLORIDE 0.9 % (FLUSH) 0.9 %
5-40 SYRINGE (ML) INJECTION EVERY 12 HOURS SCHEDULED
Status: DISCONTINUED | OUTPATIENT
Start: 2024-03-31 | End: 2024-04-02 | Stop reason: HOSPADM

## 2024-03-31 RX ORDER — DEXTROSE MONOHYDRATE 100 MG/ML
INJECTION, SOLUTION INTRAVENOUS CONTINUOUS PRN
Status: DISCONTINUED | OUTPATIENT
Start: 2024-03-31 | End: 2024-04-02 | Stop reason: HOSPADM

## 2024-03-31 RX ORDER — VASOPRESSIN 20 U/ML
INJECTION PARENTERAL
Status: DISPENSED
Start: 2024-03-31 | End: 2024-03-31

## 2024-03-31 RX ORDER — FENTANYL CITRATE 50 UG/ML
25 INJECTION, SOLUTION INTRAMUSCULAR; INTRAVENOUS EVERY 5 MIN PRN
Status: DISCONTINUED | OUTPATIENT
Start: 2024-03-31 | End: 2024-04-02 | Stop reason: HOSPADM

## 2024-03-31 RX ORDER — ONDANSETRON 2 MG/ML
4 INJECTION INTRAMUSCULAR; INTRAVENOUS EVERY 6 HOURS PRN
Status: DISCONTINUED | OUTPATIENT
Start: 2024-03-31 | End: 2024-04-02 | Stop reason: HOSPADM

## 2024-03-31 RX ORDER — SODIUM CHLORIDE 9 MG/ML
INJECTION, SOLUTION INTRAVENOUS PRN
Status: DISCONTINUED | OUTPATIENT
Start: 2024-03-31 | End: 2024-04-02 | Stop reason: HOSPADM

## 2024-03-31 RX ORDER — OXYCODONE HYDROCHLORIDE 5 MG/1
10 TABLET ORAL EVERY 4 HOURS PRN
Status: DISCONTINUED | OUTPATIENT
Start: 2024-03-31 | End: 2024-04-02 | Stop reason: HOSPADM

## 2024-03-31 RX ORDER — KETOROLAC TROMETHAMINE 30 MG/ML
30 INJECTION, SOLUTION INTRAMUSCULAR; INTRAVENOUS EVERY 6 HOURS
Status: DISPENSED | OUTPATIENT
Start: 2024-03-31 | End: 2024-04-01

## 2024-03-31 RX ORDER — PROCHLORPERAZINE EDISYLATE 5 MG/ML
5 INJECTION INTRAMUSCULAR; INTRAVENOUS
Status: ACTIVE | OUTPATIENT
Start: 2024-03-31 | End: 2024-04-01

## 2024-03-31 RX ORDER — SENNA AND DOCUSATE SODIUM 50; 8.6 MG/1; MG/1
1 TABLET, FILM COATED ORAL DAILY
Status: DISCONTINUED | OUTPATIENT
Start: 2024-03-31 | End: 2024-04-02 | Stop reason: HOSPADM

## 2024-03-31 RX ORDER — NALOXONE HYDROCHLORIDE 0.4 MG/ML
INJECTION, SOLUTION INTRAMUSCULAR; INTRAVENOUS; SUBCUTANEOUS PRN
Status: DISCONTINUED | OUTPATIENT
Start: 2024-03-31 | End: 2024-04-02 | Stop reason: HOSPADM

## 2024-03-31 RX ORDER — SODIUM CHLORIDE 9 MG/ML
INJECTION, SOLUTION INTRAVENOUS PRN
Status: DISCONTINUED | OUTPATIENT
Start: 2024-03-31 | End: 2024-03-31

## 2024-03-31 RX ORDER — FAMOTIDINE 20 MG/1
20 TABLET, FILM COATED ORAL 2 TIMES DAILY
Status: DISCONTINUED | OUTPATIENT
Start: 2024-03-31 | End: 2024-04-02 | Stop reason: HOSPADM

## 2024-03-31 RX ORDER — IPRATROPIUM BROMIDE AND ALBUTEROL SULFATE 2.5; .5 MG/3ML; MG/3ML
1 SOLUTION RESPIRATORY (INHALATION)
Status: DISPENSED | OUTPATIENT
Start: 2024-03-31 | End: 2024-04-01

## 2024-03-31 RX ORDER — ACETAMINOPHEN 500 MG
1000 TABLET ORAL EVERY 8 HOURS
Status: DISCONTINUED | OUTPATIENT
Start: 2024-03-31 | End: 2024-04-02 | Stop reason: HOSPADM

## 2024-03-31 RX ORDER — SIMETHICONE 80 MG
80 TABLET,CHEWABLE ORAL EVERY 6 HOURS PRN
Status: DISCONTINUED | OUTPATIENT
Start: 2024-03-31 | End: 2024-04-02 | Stop reason: HOSPADM

## 2024-03-31 RX ORDER — MODIFIED LANOLIN
OINTMENT (GRAM) TOPICAL
Status: DISCONTINUED | OUTPATIENT
Start: 2024-03-31 | End: 2024-04-02 | Stop reason: HOSPADM

## 2024-03-31 RX ORDER — SODIUM CHLORIDE, SODIUM LACTATE, POTASSIUM CHLORIDE, CALCIUM CHLORIDE 600; 310; 30; 20 MG/100ML; MG/100ML; MG/100ML; MG/100ML
INJECTION, SOLUTION INTRAVENOUS CONTINUOUS
Status: DISCONTINUED | OUTPATIENT
Start: 2024-03-31 | End: 2024-04-02 | Stop reason: HOSPADM

## 2024-03-31 RX ORDER — SODIUM CHLORIDE 0.9 % (FLUSH) 0.9 %
5-40 SYRINGE (ML) INJECTION PRN
Status: DISCONTINUED | OUTPATIENT
Start: 2024-03-31 | End: 2024-04-02 | Stop reason: HOSPADM

## 2024-03-31 RX ORDER — KETOROLAC TROMETHAMINE 30 MG/ML
INJECTION, SOLUTION INTRAMUSCULAR; INTRAVENOUS
Status: COMPLETED
Start: 2024-03-31 | End: 2024-03-31

## 2024-03-31 RX ORDER — OXYCODONE HYDROCHLORIDE 5 MG/1
5 TABLET ORAL EVERY 4 HOURS PRN
Status: DISCONTINUED | OUTPATIENT
Start: 2024-03-31 | End: 2024-03-31

## 2024-03-31 RX ORDER — KETOROLAC TROMETHAMINE 30 MG/ML
30 INJECTION, SOLUTION INTRAMUSCULAR; INTRAVENOUS EVERY 6 HOURS PRN
Status: DISCONTINUED | OUTPATIENT
Start: 2024-03-31 | End: 2024-03-31

## 2024-03-31 RX ORDER — DIPHENHYDRAMINE HYDROCHLORIDE 50 MG/ML
12.5 INJECTION INTRAMUSCULAR; INTRAVENOUS
Status: ACTIVE | OUTPATIENT
Start: 2024-03-31 | End: 2024-04-01

## 2024-03-31 RX ORDER — ONDANSETRON 4 MG/1
4 TABLET, ORALLY DISINTEGRATING ORAL EVERY 8 HOURS PRN
Status: DISCONTINUED | OUTPATIENT
Start: 2024-03-31 | End: 2024-04-02 | Stop reason: HOSPADM

## 2024-03-31 RX ORDER — ONDANSETRON 2 MG/ML
4 INJECTION INTRAMUSCULAR; INTRAVENOUS
Status: DISCONTINUED | OUTPATIENT
Start: 2024-03-31 | End: 2024-03-31

## 2024-03-31 RX ORDER — OXYCODONE HYDROCHLORIDE 5 MG/1
5 TABLET ORAL EVERY 4 HOURS PRN
Status: DISCONTINUED | OUTPATIENT
Start: 2024-03-31 | End: 2024-04-02 | Stop reason: HOSPADM

## 2024-03-31 RX ORDER — IBUPROFEN 800 MG/1
800 TABLET ORAL EVERY 8 HOURS
Status: DISCONTINUED | OUTPATIENT
Start: 2024-04-01 | End: 2024-04-02 | Stop reason: HOSPADM

## 2024-03-31 RX ORDER — GLUCAGON 1 MG/ML
1 KIT INJECTION PRN
Status: DISCONTINUED | OUTPATIENT
Start: 2024-03-31 | End: 2024-04-02 | Stop reason: HOSPADM

## 2024-03-31 RX ADMIN — ACETAMINOPHEN 1000 MG: 500 TABLET ORAL at 14:06

## 2024-03-31 RX ADMIN — Medication: at 12:49

## 2024-03-31 RX ADMIN — SIMETHICONE 80 MG: 80 TABLET, CHEWABLE ORAL at 22:37

## 2024-03-31 RX ADMIN — SIMETHICONE 80 MG: 80 TABLET, CHEWABLE ORAL at 15:55

## 2024-03-31 RX ADMIN — KETOROLAC TROMETHAMINE 30 MG: 30 INJECTION, SOLUTION INTRAMUSCULAR at 06:27

## 2024-03-31 RX ADMIN — KETOROLAC TROMETHAMINE 30 MG: 30 INJECTION, SOLUTION INTRAMUSCULAR at 12:37

## 2024-03-31 RX ADMIN — KETOROLAC TROMETHAMINE 30 MG: 30 INJECTION, SOLUTION INTRAMUSCULAR at 20:23

## 2024-03-31 RX ADMIN — FAMOTIDINE 20 MG: 20 TABLET ORAL at 20:24

## 2024-03-31 RX ADMIN — KETOROLAC TROMETHAMINE 30 MG: 30 INJECTION, SOLUTION INTRAMUSCULAR at 00:42

## 2024-03-31 RX ADMIN — SODIUM CHLORIDE, PRESERVATIVE FREE 10 ML: 5 INJECTION INTRAVENOUS at 08:33

## 2024-03-31 RX ADMIN — SODIUM CHLORIDE, PRESERVATIVE FREE 10 ML: 5 INJECTION INTRAVENOUS at 20:24

## 2024-03-31 RX ADMIN — DOCUSATE SODIUM 50 MG AND SENNOSIDES 8.6 MG 1 TABLET: 8.6; 5 TABLET, FILM COATED ORAL at 14:58

## 2024-03-31 ASSESSMENT — PAIN DESCRIPTION - ORIENTATION
ORIENTATION: LOWER
ORIENTATION: MID
ORIENTATION: LOWER
ORIENTATION: LOWER

## 2024-03-31 ASSESSMENT — PAIN DESCRIPTION - DESCRIPTORS
DESCRIPTORS: SORE
DESCRIPTORS: SORE
DESCRIPTORS: ACHING
DESCRIPTORS: ACHING
DESCRIPTORS: DISCOMFORT;SORE;TENDER

## 2024-03-31 ASSESSMENT — PAIN SCALES - GENERAL
PAINLEVEL_OUTOF10: 3
PAINLEVEL_OUTOF10: 5
PAINLEVEL_OUTOF10: 6
PAINLEVEL_OUTOF10: 5
PAINLEVEL_OUTOF10: 6
PAINLEVEL_OUTOF10: 6
PAINLEVEL_OUTOF10: 5

## 2024-03-31 ASSESSMENT — PAIN - FUNCTIONAL ASSESSMENT: PAIN_FUNCTIONAL_ASSESSMENT: ACTIVITIES ARE NOT PREVENTED

## 2024-03-31 ASSESSMENT — PAIN DESCRIPTION - LOCATION
LOCATION: INCISION
LOCATION: ABDOMEN

## 2024-03-31 NOTE — PROGRESS NOTES
PreOp DX:  IOL Pregnancy, LGA, GDM- insulin    Primary C/Section for arrest of descent      Post OP Dx:  first Pregnancy delivered by                        Living girl baby delivered      Procedure:  Surgery/ Assistance    Anesthesia: epidural to spinal      Under spinal anesthesia the abdomen was prepared and draped .    With my technical assistance Dr Bell performed  a , I assisted- in the absence of a surgical resident -with retraction, exposure, delivery of infant, and suture cutting/management throughout the entire procedure.     Clear amniotic fluid.  A living female infant was delivered at 2230, weighing 8lb 10oz with Apgar scores of 9 at 1 minute & 9 at 5 minutes, respectively.  (-) nuchal cord. (-) meconium.  3 vessel umbilical cord.    Then the uterus and the abdomen were closed.    Procedure was well tolerated.    CHITO Rangel - SARAH

## 2024-03-31 NOTE — PROGRESS NOTES
Patient reports she wants to try pushing for bit longer. Discussed this is reasonable. Ok to attempt to pushing. FHRT with mod esteban and accels.

## 2024-03-31 NOTE — PROGRESS NOTES
[employer] [place] [facility] screening results were discussed with parent. Questions answered. Brochure given to parent. Advised to monitor developmental milestones and contact physician for any concerns.   Anali Vizcarra, AuD

## 2024-03-31 NOTE — PROGRESS NOTES
Pt transferred to unit.  paperwork reviewed. education given on safe sleep . Pt denies questions. Call light within reach. Instructed to call for assistance .

## 2024-03-31 NOTE — PROGRESS NOTES
Post-Partum Note    POD#1    S:  Patient without complaints.  breast feeding.  Lochia       normal.  Ambulating.  No no nausea and no vomiting. Tolerating regular diet       Flatus:  No.  Bowel movement:  No.      O: BP 99/61   Pulse 83   Temp 97.5 °F (36.4 °C) (Temporal)   Resp 16   Ht 1.676 m (5' 6\")   Wt 79.4 kg (175 lb)   LMP 2023   SpO2 97%   Breastfeeding Unknown   BMI 28.25 kg/m²               Heart:  RRR       Lungs: CTA B/L       ABD:   BS positive.  Soft.  non-distended.  Uterus firm, non-tender.         Incision dressing intact, quarter sized area of saturation on right side.           EXT:     No Serjio's.         LABS:  CBC with Differential:    Lab Results   Component Value Date/Time    WBC 17.1 2024 05:35 AM    RBC 2.76 2024 05:35 AM    HGB 7.1 2024 05:35 AM    HCT 22.8 2024 05:35 AM     2024 01:22 PM    MCV 82.6 2024 05:35 AM    MCH 25.7 2024 05:35 AM    MCHC 31.1 2024 05:35 AM    RDW 14.9 2024 05:35 AM       IMP:  1.  POD#1, status-post  section            2.  Doing well without complaints  Patient Active Problem List   Diagnosis    Primigravida, third trimester    Insulin controlled gestational diabetes mellitus (GDM) in third trimester    Excessive fetal growth affecting management of pregnancy in third trimester    Encounter for induction of labor    39 weeks gestation of pregnancy    Arrest of descent, delivered, current hospitalization    Delivery by  section at 37-39 weeks of gestation due to labor              3.  acute blood loss anemia    Plan: 1.  Routine post-op/partum care           2.  Iron supplementation, repeat h/h in AM   3. Encouraged ambulation    4. Lactation consult if needed

## 2024-03-31 NOTE — L&D DELIVERY NOTE
Brianna Art [[id number]]      Labor Events     Labor: No   Steroids: None  Cervical Ripening Date/Time:        Rupture Date/Time:  3/30/24 07:13:00   Rupture Type: SROM, AROM  Fluid Color: Clear  Fluid Odor: None              Anesthesia    Method: Spinal, Epidural       Labor Event Times      Labor onset date/time:        Dilation complete date/time:  3/30/24 20:05:00 EDT     Start pushing date/time:     Decision date/time (emergent ):            Delivery Details      Delivery Date: 3/30/24 Delivery Time: 22:30:00   Delivery Type: , Low Transverse  Trial of Labor?: Yes   Categorization: Primary   Priority: unscheduled  Indications for : Arrest of Descent       Skin Incision Type: Low Transverse  Uterine Incision: Low Transverse        Presentation    Presentation: Vertex       Shoulder Dystocia    Shoulder Dystocia Present?: No       Assisted Delivery Details    Forceps Attempted?: No  Vacuum Extractor Attempted?: No                           Cord    Vessels: 3 Vessels  Complications: None  Delayed Cord Clamping?: Yes  Cord Clamped Date/Time: 3/30/2024 22:30:30  Cord Blood Disposition: Lab  Gases Sent?: Yes              Placenta    Date/Time: 3/30/2024 22:31:00  Removal: Manual Removal  Appearance: Intact  Disposition: Placenta Refrigerator       Lacerations           Blood Loss  Mother: Jose Alberto Artary R #[id number]     Start of Mother's Information      Delivery Blood Loss  245 - 24 2246      None                 End of Mother's Information  Mother: Rubens Alyse R #[id number]                Delivery Providers    Delivering clinician:      Provider Role     Obstetrician     Primary Nurse     Primary [place] Nurse     NICU Nurse     Neonatologist     Anesthesiologist     Nurse Anesthetist     Nurse Practitioner     Midwife     Nursery Nurse               Assessment          Skin Color:   Heart Rate:   Reflex Irritability:

## 2024-03-31 NOTE — PROGRESS NOTES
Patient has been pushing for approx 1 hour with no descent. She reports feeling exhausted. Evaluated patient at bedside. No descent noted with the pushing. Discussed that given the concern for macrosomia would recommend csection at this point. Patient agrees.     Delivery: Alyse Art and I reconfirmed the indication for  delivery. I explained the relevant risks, including, but not limited to, anesthesia, hemorrhage (risk for blood transfusion of 2%), infection (5-10%), damage to adjacent structures (<0.5%), possible injury to baby (<1%), possible need for vacuum delivery of head, venous thrombosis/pulmonary embolism, rare need for hysterectomy (<0.5%), need for  delivery in future pregnancies and  complications. We discussed the alternative of vaginal delivery. I explained the probable length of stay and criteria for discharge. I described the normal discomforts, activity restrictions and recovery period for the procedure. We discussed that  section may be necessary for delivery of subsequent pregnancies. I answered all her questions, and Alyse Art indicated her understanding and desire to proceed.

## 2024-03-31 NOTE — PROGRESS NOTES
Call to mom baby unit  Pt recovery complete  Pericare provided, pads, underwear placed on patient  [place] catheter emptied  Uterus firm, -2, no clots  VSS  Mom/baby notified of transfer

## 2024-03-31 NOTE — PROGRESS NOTES
Notified Dr. Pierre that patient hgb this morning is 7.1 and is refusing iron pills. Patient prefers to take own personal liquid iron supplementation. Per Dr. Pierre, taking own medication is ok. Patient does not currently have the medication at hand, but family planning on bringing it in today.

## 2024-03-31 NOTE — PROGRESS NOTES
Went in for reeval. No descent noted. Fetal head still at +1 at best. Recommended csection. Patient is ok with proceeding.

## 2024-04-01 LAB
HCT VFR BLD AUTO: 21.5 % (ref 34–48)
HCT VFR BLD AUTO: 26.3 % (ref 34–48)
HGB BLD-MCNC: 6.6 G/DL (ref 11.5–15.5)
HGB BLD-MCNC: 8.2 G/DL (ref 11.5–15.5)

## 2024-04-01 PROCEDURE — 1220000000 HC SEMI PRIVATE OB R&B

## 2024-04-01 PROCEDURE — 2580000003 HC RX 258: Performed by: STUDENT IN AN ORGANIZED HEALTH CARE EDUCATION/TRAINING PROGRAM

## 2024-04-01 PROCEDURE — 85018 HEMOGLOBIN: CPT

## 2024-04-01 PROCEDURE — 30233N1 TRANSFUSION OF NONAUTOLOGOUS RED BLOOD CELLS INTO PERIPHERAL VEIN, PERCUTANEOUS APPROACH: ICD-10-PCS | Performed by: STUDENT IN AN ORGANIZED HEALTH CARE EDUCATION/TRAINING PROGRAM

## 2024-04-01 PROCEDURE — 2580000003 HC RX 258: Performed by: ANESTHESIOLOGY

## 2024-04-01 PROCEDURE — 85014 HEMATOCRIT: CPT

## 2024-04-01 PROCEDURE — 6370000000 HC RX 637 (ALT 250 FOR IP): Performed by: STUDENT IN AN ORGANIZED HEALTH CARE EDUCATION/TRAINING PROGRAM

## 2024-04-01 PROCEDURE — P9016 RBC LEUKOCYTES REDUCED: HCPCS

## 2024-04-01 PROCEDURE — 6360000002 HC RX W HCPCS: Performed by: STUDENT IN AN ORGANIZED HEALTH CARE EDUCATION/TRAINING PROGRAM

## 2024-04-01 PROCEDURE — 99024 POSTOP FOLLOW-UP VISIT: CPT | Performed by: STUDENT IN AN ORGANIZED HEALTH CARE EDUCATION/TRAINING PROGRAM

## 2024-04-01 PROCEDURE — 36430 TRANSFUSION BLD/BLD COMPNT: CPT

## 2024-04-01 RX ORDER — SODIUM CHLORIDE 9 MG/ML
INJECTION, SOLUTION INTRAVENOUS PRN
Status: DISCONTINUED | OUTPATIENT
Start: 2024-04-01 | End: 2024-04-02 | Stop reason: HOSPADM

## 2024-04-01 RX ADMIN — SODIUM CHLORIDE, PRESERVATIVE FREE 10 ML: 5 INJECTION INTRAVENOUS at 15:29

## 2024-04-01 RX ADMIN — DOCUSATE SODIUM 50 MG AND SENNOSIDES 8.6 MG 1 TABLET: 8.6; 5 TABLET, FILM COATED ORAL at 09:42

## 2024-04-01 RX ADMIN — SIMETHICONE 80 MG: 80 TABLET, CHEWABLE ORAL at 02:59

## 2024-04-01 RX ADMIN — IBUPROFEN 800 MG: 800 TABLET, FILM COATED ORAL at 11:38

## 2024-04-01 RX ADMIN — SIMETHICONE 80 MG: 80 TABLET, CHEWABLE ORAL at 15:22

## 2024-04-01 RX ADMIN — SIMETHICONE 80 MG: 80 TABLET, CHEWABLE ORAL at 09:42

## 2024-04-01 RX ADMIN — SODIUM CHLORIDE, PRESERVATIVE FREE 20 ML: 5 INJECTION INTRAVENOUS at 13:11

## 2024-04-01 RX ADMIN — KETOROLAC TROMETHAMINE 30 MG: 30 INJECTION, SOLUTION INTRAMUSCULAR at 02:48

## 2024-04-01 RX ADMIN — ACETAMINOPHEN 1000 MG: 500 TABLET ORAL at 09:42

## 2024-04-01 RX ADMIN — SODIUM CHLORIDE, PRESERVATIVE FREE 10 ML: 5 INJECTION INTRAVENOUS at 09:30

## 2024-04-01 RX ADMIN — ACETAMINOPHEN 1000 MG: 500 TABLET ORAL at 20:33

## 2024-04-01 RX ADMIN — SIMETHICONE 80 MG: 80 TABLET, CHEWABLE ORAL at 20:33

## 2024-04-01 RX ADMIN — Medication: at 09:42

## 2024-04-01 ASSESSMENT — PAIN DESCRIPTION - DESCRIPTORS
DESCRIPTORS: DISCOMFORT;SORE;TENDER
DESCRIPTORS: DISCOMFORT
DESCRIPTORS: DISCOMFORT;OTHER (COMMENT)

## 2024-04-01 ASSESSMENT — PAIN DESCRIPTION - ORIENTATION
ORIENTATION: LOWER
ORIENTATION: LOWER
ORIENTATION: RIGHT
ORIENTATION: LOWER

## 2024-04-01 ASSESSMENT — PAIN DESCRIPTION - LOCATION
LOCATION: INCISION
LOCATION: INCISION
LOCATION: ABDOMEN
LOCATION: INCISION

## 2024-04-01 ASSESSMENT — PAIN - FUNCTIONAL ASSESSMENT
PAIN_FUNCTIONAL_ASSESSMENT: ACTIVITIES ARE NOT PREVENTED

## 2024-04-01 ASSESSMENT — PAIN SCALES - GENERAL
PAINLEVEL_OUTOF10: 4
PAINLEVEL_OUTOF10: 6

## 2024-04-01 NOTE — PROGRESS NOTES
Department of Obstetrics and Gynecology  Labor and Delivery  Attending Post Partum Progress Note    Subjective:   Pt seen & examined, no overnight complaints.  Pain well controlled.  Lochia decreasing.  Denies any fevers, chills, weakness, dizziness, headache, chest pain, vision changes, SOB, nausea, or vomiting. Breastfeeding Voided spontaneously Ambulating    Review of Systems  Skin: no changes  All other review of systems negative    Physical Exam:  Vitals:    03/31/24 0921 03/31/24 1530 03/31/24 1536 03/31/24 2305   BP: 99/61 (!) 96/56 (!) 101/58 110/71   Pulse: 83 89  99   Resp: 16   16   Temp: 97.5 °F (36.4 °C) 97.8 °F (36.6 °C)  97.3 °F (36.3 °C)   TempSrc: Temporal Temporal  Temporal   SpO2: 97%   99%   Weight:       Height:           General: NAD, comfortable  CV: regular rate and rhythm  Lungs: clear to auscultation bilaterally  Abd: soft, non tender  Incision: c/d/i  Ext: no swelling      Labs:  Admission on 03/29/2024   Component Date Value Ref Range Status    Amphetamine Screen, Ur 03/29/2024 NEGATIVE  NEGATIVE Final    Cutoff: 1000 ng/mL    Barbiturate Screen, Ur 03/29/2024 NEGATIVE  NEGATIVE Final    Cutoff: 200 ng/ml    Cocaine Metabolite, Urine 03/29/2024 NEGATIVE  NEGATIVE Final    Cutoff: 300 ng/ml    Benzodiazepine Screen, Urine 03/29/2024 NEGATIVE  NEGATIVE Final    Cutoff: 200 ng/ml    Buprenorphine Urine 03/29/2024 NEGATIVE  NEGATIVE Final    Cutoff: 5 ng/ml    Methadone Screen, Urine 03/29/2024 NEGATIVE  NEGATIVE Final    Cutoff: 300 ng/ml    Opiates, Urine 03/29/2024 NEGATIVE  NEGATIVE Final    Comment: Cutoff: 300 ng/ml  Note: The Opiate screen is not intended to detect Oxycodone.      Phencyclidine, Urine 03/29/2024 NEGATIVE  NEGATIVE Final    Cutoff: 25 ng/ml    Cannabinoid Scrn, Ur 03/29/2024 NEGATIVE  NEGATIVE Final    Cutoff: 50 ng/ml    Fentanyl, Ur 03/29/2024 NEGATIVE  NEGATIVE Final    Cutoff: 1.0 ng/ml    Oxycodone Screen, Ur 03/29/2024 NEGATIVE  NEGATIVE Final    Cutoff: 100 ng/ml

## 2024-04-01 NOTE — PROGRESS NOTES
Name: Alyse Art                Judaism: Congregation   Referral: Baby [facility]      Assessment:  Parent(s) were receptive to  visit and baby blessing.        Intervention:   provided blessing for new born and parent(s) and distributed prayer card for family. Patient shared...     Outcome:  Parent/S appreciated blessing for child.     Plan:  Chaplains will remain available to offer spiritual and emotional support as needed.       Electronically signed by MIKE Miles, on 4/1/2024 at 1:55 PM.  [facility]  [facility]  [phone]

## 2024-04-01 NOTE — PLAN OF CARE
Problem: Pain  Goal: Verbalizes/displays adequate comfort level or baseline comfort level  Outcome: Progressing     Problem: Infection - Adult  Goal: Absence of infection at discharge  Outcome: Progressing     Problem: Infection - Adult  Goal: Absence of infection during hospitalization  Outcome: Progressing     Problem: Infection - Adult  Goal: Absence of fever/infection during anticipated neutropenic period  Outcome: Progressing     Problem: Safety - Adult  Goal: Free from fall injury  Outcome: Progressing     Problem: Discharge Planning  Goal: Discharge to home or other facility with appropriate resources  Outcome: Progressing     Problem: ABCDS Injury Assessment  Goal: Absence of physical injury  Outcome: Progressing

## 2024-04-02 VITALS
TEMPERATURE: 97.5 F | BODY MASS INDEX: 28.12 KG/M2 | SYSTOLIC BLOOD PRESSURE: 111 MMHG | HEART RATE: 74 BPM | WEIGHT: 175 LBS | DIASTOLIC BLOOD PRESSURE: 69 MMHG | HEIGHT: 66 IN | OXYGEN SATURATION: 99 % | RESPIRATION RATE: 16 BRPM

## 2024-04-02 LAB
ABO/RH: NORMAL
ANTIBODY SCREEN: NEGATIVE
ARM BAND NUMBER: NORMAL
BLOOD BANK BLOOD PRODUCT EXPIRATION DATE: NORMAL
BLOOD BANK BLOOD PRODUCT EXPIRATION DATE: NORMAL
BLOOD BANK DISPENSE STATUS: NORMAL
BLOOD BANK DISPENSE STATUS: NORMAL
BLOOD BANK ISBT PRODUCT BLOOD TYPE: 5100
BLOOD BANK ISBT PRODUCT BLOOD TYPE: 5100
BLOOD BANK PRODUCT CODE: NORMAL
BLOOD BANK PRODUCT CODE: NORMAL
BLOOD BANK SAMPLE EXPIRATION: NORMAL
BLOOD BANK UNIT TYPE AND RH: NORMAL
BLOOD BANK UNIT TYPE AND RH: NORMAL
BPU ID: NORMAL
BPU ID: NORMAL
COMPONENT: NORMAL
COMPONENT: NORMAL
CROSSMATCH RESULT: NORMAL
CROSSMATCH RESULT: NORMAL
TRANSFUSION STATUS: NORMAL
TRANSFUSION STATUS: NORMAL
UNIT DIVISION: 0
UNIT DIVISION: 0
UNIT ISSUE DATE/TIME: NORMAL
UNIT ISSUE DATE/TIME: NORMAL

## 2024-04-02 PROCEDURE — APPNB30 APP NON BILLABLE TIME 0-30 MINS: Performed by: PHYSICIAN ASSISTANT

## 2024-04-02 PROCEDURE — 6370000000 HC RX 637 (ALT 250 FOR IP): Performed by: STUDENT IN AN ORGANIZED HEALTH CARE EDUCATION/TRAINING PROGRAM

## 2024-04-02 RX ORDER — IBUPROFEN 600 MG/1
600 TABLET ORAL EVERY 8 HOURS PRN
Qty: 30 TABLET | Refills: 0 | Status: SHIPPED | OUTPATIENT
Start: 2024-04-02

## 2024-04-02 RX ORDER — OXYCODONE HYDROCHLORIDE 5 MG/1
5 TABLET ORAL EVERY 6 HOURS PRN
Qty: 6 TABLET | Refills: 0 | Status: SHIPPED | OUTPATIENT
Start: 2024-04-02 | End: 2024-04-05

## 2024-04-02 RX ADMIN — DOCUSATE SODIUM 50 MG AND SENNOSIDES 8.6 MG 1 TABLET: 8.6; 5 TABLET, FILM COATED ORAL at 09:26

## 2024-04-02 RX ADMIN — ACETAMINOPHEN 1000 MG: 500 TABLET ORAL at 04:39

## 2024-04-02 RX ADMIN — SIMETHICONE 80 MG: 80 TABLET, CHEWABLE ORAL at 04:40

## 2024-04-02 RX ADMIN — IBUPROFEN 800 MG: 800 TABLET, FILM COATED ORAL at 01:39

## 2024-04-02 RX ADMIN — FAMOTIDINE 20 MG: 20 TABLET ORAL at 09:26

## 2024-04-02 RX ADMIN — IBUPROFEN 800 MG: 800 TABLET, FILM COATED ORAL at 09:36

## 2024-04-02 ASSESSMENT — PAIN SCALES - GENERAL
PAINLEVEL_OUTOF10: 0
PAINLEVEL_OUTOF10: 5
PAINLEVEL_OUTOF10: 4

## 2024-04-02 ASSESSMENT — PAIN DESCRIPTION - ORIENTATION: ORIENTATION: LOWER

## 2024-04-02 ASSESSMENT — PAIN DESCRIPTION - DESCRIPTORS: DESCRIPTORS: DISCOMFORT;SORE;TENDER

## 2024-04-02 ASSESSMENT — PAIN - FUNCTIONAL ASSESSMENT: PAIN_FUNCTIONAL_ASSESSMENT: ACTIVITIES ARE NOT PREVENTED

## 2024-04-02 ASSESSMENT — PAIN DESCRIPTION - LOCATION: LOCATION: INCISION

## 2024-04-02 NOTE — DISCHARGE SUMMARY
Obstetrical Discharge Form    Patient Nam: Alyse Art    YOB: 1990    Medical Record Number: [id number]    Primary OB Clinician: Guillermina Restrepo DO    Reason for Hospitalization, Chief Complaint, Diagnosis, etc: 33 year old female  1 para 0 at 39 weeks' 0 days' gestation admitted for scheduled induction. Pregnancy complicated by IRGDM and suspected macrosomia.    Gestational Age at time of delivery:  39 weeks 1 days    Date of Admission: 2024     Date of Delivery: 2024    Delivery Type: primary  section, low transverse incision secondary to arrest of descent    Anesthesia: epidural    Baby: Liveborn female, weighing 3920 grams with Apgar scores of 9 & 9 delivered at 22:30    Intrapartum complications: acute on chronic blood loss anemia- received 2 units pRBCs on 2024    Feeding method: both breast and bottle - Similac Advance    Discharge Date: 2024 post-operative/postpartum day # 3    Condition: both Mother and infant discharged home in stable condition  Plan:     Patient advised to call and schedule follow-up appointment with Dr. Silva for next week for dressing removal and incision check, and at 6 weeks postpartum.      Precautions given: call for: fever greater than 101 F, pain, heavy bleeding, vomiting, shortness of breath, breast redness or pain, calf pain, or any concerns. No heavy lifting. No sexual intercourse for 6 weeks. If  section: call for: redness, drainage, or separation of incision.    Please refer to chart for further details.    Scripts: Roxicodone, Motrin. Resume iron and PNV.        Nandini Magallanes PA-C

## 2024-04-02 NOTE — LACTATION NOTE
Mom continues to breastfeed and formula feed baby.  Taught mom paced bottle feeding and to always offer breast first.  Reviewed signs of adequate milk transfer.  Encouraged mom to attend the breastfeeding support group and to call us with questions or concerns.

## 2024-04-02 NOTE — PROGRESS NOTES
SUBJECTIVE:  Patient without complaint. Pain well-controlled. Denies any fevers, chills, weakness, dizziness, headache, chest pain, vision changes, SOB, nausea, or vomiting. Voiding without difficulty. Passing flatus. Tolerating regular diet. Mild lochia, denies passing clots. Ambulating in hallway. Denies emotional concerns. Breast feeding infant.    OBJECTIVE:  /69   Pulse 74   Temp 97.5 °F (36.4 °C) (Oral)   Resp 16   Ht 1.676 m (5' 6\")   Wt 79.4 kg (175 lb)   LMP 2023   SpO2 99%   BMI 28.25 kg/m²   Recent Labs     24  1635 24  0542 24  0535 24  2135   WBC  --   --  17.1* 8.6   HGB 8.2* 6.6* 7.1* 9.5*   HCT 26.3* 21.5* 22.8* 31.0*   MCV  --   --  82.6 83.8      Physical Exam:  General: A&O. Cooperative. Comfortable.  Coronary: RRR  Pulmonary: CTA b/l  Abdomen: soft, appropriately tender. (+) BS x 4 quadrants.   Incision covered with Mepilex dressing: clean, no saturation   Uterus firm, nontender  Peripad: Lochia thin rubra  Back: (-) CVA or paraspinal tenderness.   Extremities: (-) edema, (-) calf tenderness.  Neuro: Grossly intact.        ASSESSMENT/PLAN: 32 yo female  39w1d s/p primary LTCS 2024 at 22:30 secondary to arrest of descent. Pregnancy complicated by GDMA2 and LGA.  Postoperative Day #3  Acute on chronic blood loss anemia- received 2 units pRBC's yesterday- asymptomatic  Advance care  Discharge, precautions given  Scripts for Roxicodone, Motrin. Resume iron and PNV.  [place] [facility] [place] dressing removal and incision check, and at 6 weeks postpartum    Nandini Magallanes PA-C 2024 7:43 AM

## 2024-04-02 NOTE — DISCHARGE INSTRUCTIONS
Follow-up with your OB doctor in 1 week if  delivery or in  6 weeks for vaginal delivery unless otherwise instructed.   Call office for an appointment.    For breastfeeding support, you can contact our lactation specialists at [phone] or [phone]    DIET  Eat a well balanced diet focusing on foods high in fiber and protein  Drink plenty of fluids especially water.  To avoid constipation you may take a mild stool softener as recommended by your doctor or midwife.    ACTIVITY  Gradually increase your activity.  Resume exercise regimen only after advised by your doctor or midwife.  Avoid lifting anything heavier than your baby or a gallon of milk for SIX weeks.   Avoid driving until your doctor or midwife has given their approval.  Rise slowly from a lying to sitting and then a standing position.  Climb stairs one at a time.  Use caution when carrying your baby up and down the stairs.  No sexual activity for 6 weeks or until advised by your doctor - Nothing in vagina: intercourse, tampons, or douching.   Be prepared to discuss family planning at your follow-up OB visit.   You may feel tired or have a lack of energy.  You may continue your prenatal vitamin to replenish nutrients post delivery.  Nap when baby naps to catch up on sleep.  May return to work or school in 6 weeks or as directed by OB.     EMOTIONS  You may feed alonzo, sad, teary, & overwhelmed.  Contact your OB provider if you feel you may be showing signs of postpartum depression, or have thoughts of harming yourself or your infant.  If infant will not stop crying, contact another adult for help or place infant in their crib on their back and take a break.  NEVER shake your infant.      BLEEDING  Vaginal bleeding will decrease in amount over the next few weeks.  You will notice that as your activity increases, your flow may increase.  This is your body's way of telling you, you need to take things easier and rest more often.  Call your

## 2024-04-04 LAB — SURGICAL PATHOLOGY REPORT: NORMAL

## 2024-04-08 ENCOUNTER — POSTPARTUM VISIT (OUTPATIENT)
Dept: OBGYN | Age: 34
End: 2024-04-08
Payer: COMMERCIAL

## 2024-04-08 ENCOUNTER — HOSPITAL ENCOUNTER (OUTPATIENT)
Age: 34
Discharge: HOME | End: 2024-04-08
Payer: COMMERCIAL

## 2024-04-08 VITALS
WEIGHT: 155.2 LBS | HEART RATE: 86 BPM | DIASTOLIC BLOOD PRESSURE: 85 MMHG | BODY MASS INDEX: 25.05 KG/M2 | SYSTOLIC BLOOD PRESSURE: 129 MMHG

## 2024-04-08 DIAGNOSIS — Z98.891 PREVIOUS CESAREAN SECTION: ICD-10-CM

## 2024-04-08 PROBLEM — O36.63X0 EXCESSIVE FETAL GROWTH AFFECTING MANAGEMENT OF PREGNANCY IN THIRD TRIMESTER: Status: RESOLVED | Noted: 2024-03-21 | Resolved: 2024-04-08

## 2024-04-08 PROBLEM — Z34.03 PRIMIGRAVIDA, THIRD TRIMESTER: Status: RESOLVED | Noted: 2023-09-01 | Resolved: 2024-04-08

## 2024-04-08 PROBLEM — Z34.90 ENCOUNTER FOR INDUCTION OF LABOR: Status: RESOLVED | Noted: 2024-03-29 | Resolved: 2024-04-08

## 2024-04-08 PROBLEM — Z3A.39 39 WEEKS GESTATION OF PREGNANCY: Status: RESOLVED | Noted: 2024-03-29 | Resolved: 2024-04-08

## 2024-04-08 LAB
ERYTHROCYTE [DISTWIDTH] IN BLOOD BY AUTOMATED COUNT: 15.2 % (ref 11.5–15)
HCT VFR BLD AUTO: 33.7 % (ref 34–48)
HGB BLD-MCNC: 10.2 G/DL (ref 11.5–15.5)
MCH RBC QN AUTO: 26.3 PG (ref 26–35)
MCHC RBC AUTO-ENTMCNC: 30.3 G/DL (ref 32–34.5)
MCV RBC AUTO: 86.9 FL (ref 80–99.9)
PLATELET # BLD AUTO: 307 K/UL (ref 130–450)
PMV BLD AUTO: 11.1 FL (ref 7–12)
RBC # BLD AUTO: 3.88 M/UL (ref 3.5–5.5)
WBC OTHER # BLD: 7.6 K/UL (ref 4.5–11.5)

## 2024-04-08 PROCEDURE — 85027 COMPLETE CBC AUTOMATED: CPT

## 2024-04-08 PROCEDURE — 99213 OFFICE O/P EST LOW 20 MIN: CPT

## 2024-04-08 PROCEDURE — 99212 OFFICE O/P EST SF 10 MIN: CPT

## 2024-04-08 PROCEDURE — 36415 COLL VENOUS BLD VENIPUNCTURE: CPT

## 2024-04-08 NOTE — PROGRESS NOTES
Postpartum Visit: Patient here for 1 week postpartum visit/incision check. She is 1 weeks post partum following a low cervical transverse  section. The delivery was at 39 gestational weeks. Outcome: PCS due to arrest of descent, LGA baby, insulin requiring GDM.  Anesthesia: epidural to spinal.  Postpartum course has been complicated by blood transfusion PP . Still taking iron supps.  Denies any lightheadedness, dizziness, excess fatigue. Saturday only she had some spotty vision which resolved with a nap.    Baby's course has been doing well without problems. Baby is feeding both breast and bottle - Enfamil.  Bleeding thin lochia, red.  Bowel function is normal. Bladder function is normal. Patient is not sexually active. Patient reports waking up soaking wet, reassurance given on PP diuresis.  PP hemorrhage warning signs reviewed.    Incision clean, dry, intact. No erythema.  Continue taking iron, repeat CBC this week, will call with results  RTO 4 weeks for 6wk PP as scheduled    CHITO Rangel CNM

## 2024-04-08 NOTE — PROGRESS NOTES
Here for pp incision check  Incision well approximated and dry  Pp depression screening score 0  Patient is bottle feeding  No concerns at this time  Still bleeding

## 2024-05-09 ENCOUNTER — POSTPARTUM VISIT (OUTPATIENT)
Dept: OBGYN | Age: 34
End: 2024-05-09
Payer: COMMERCIAL

## 2024-05-09 VITALS
DIASTOLIC BLOOD PRESSURE: 87 MMHG | BODY MASS INDEX: 24.1 KG/M2 | WEIGHT: 149.3 LBS | SYSTOLIC BLOOD PRESSURE: 121 MMHG | HEART RATE: 100 BPM

## 2024-05-09 PROCEDURE — 99213 OFFICE O/P EST LOW 20 MIN: CPT | Performed by: OBSTETRICS & GYNECOLOGY

## 2024-05-09 NOTE — PROGRESS NOTES
Patient here for OB post partum visit. Patient is bottle and breast feeding. Has no concerns at this time. Incision clean dry and intact, well approximated.   
Age of Onset    Diabetes Mother     Diabetes Father     Pervasive Developmental Disorders  Maternal Aunt         developmental disability    Other Maternal Cousin         developmental disability       REVIEW OF SYSTEMS:    Constitutional: negative  HEENT: negative  Breast: negative  Respiratory: negative  Cardiovascular: negative  Gastrointestinal: negative  Genitourinary:negative  Integument: negative  Neurological: negative  Endocrine: negative          PHYSICAL EXAM  /87 (Position: Sitting)   Pulse 100   Wt 67.7 kg (149 lb 4.8 oz)   LMP 06/21/2023   BMI 24.10 kg/m²       General appearance: alert, cooperative and in no acute distress.  Head: NCAT   Abdomen: soft, nontender, nondistended, no masses palpable, no rebound tenderness, no guarding or rigidity  Incision: C/D/I  Neurologic: Alert and oriented, no focal deficits  Psych: Alert, oriented, mood/affect full range, no acute distress    Pelvic Exam:   EXTERNAL GENITALIA: normal external genitalia, no external lesions  VAGINA: normal rugae, no discharge   CERVIX: normal appearing, no lesions, no bleeding  UTERUS: uterus is normal size, shape, consistency and nontender   ADNEXA: normal adnexa in size, nontender and no masses.  RECTUM: no hemorrhoids or rectal masses.          ASSESSMENT :    Diagnosis Orders   1. Postpartum state             PLAN:    Return in about 4 months (around 9/9/2024) for Annual exam.      No orders of the defined types were placed in this encounter.      No orders of the defined types were placed in this encounter.        Electronically signed by Guillermina Restrepo DO on 5/9/24

## 2024-09-12 ENCOUNTER — OFFICE VISIT (OUTPATIENT)
Dept: OBGYN | Age: 34
End: 2024-09-12
Payer: COMMERCIAL

## 2024-09-12 VITALS
SYSTOLIC BLOOD PRESSURE: 110 MMHG | DIASTOLIC BLOOD PRESSURE: 75 MMHG | OXYGEN SATURATION: 98 % | RESPIRATION RATE: 16 BRPM | HEIGHT: 66 IN | HEART RATE: 80 BPM | WEIGHT: 150.13 LBS | BODY MASS INDEX: 24.13 KG/M2 | TEMPERATURE: 98.1 F

## 2024-09-12 DIAGNOSIS — O24.419 GESTATIONAL DIABETES MELLITUS (GDM) IN THIRD TRIMESTER, GESTATIONAL DIABETES METHOD OF CONTROL UNSPECIFIED: ICD-10-CM

## 2024-09-12 DIAGNOSIS — Z01.419 ENCOUNTER FOR GYNECOLOGICAL EXAMINATION: Primary | ICD-10-CM

## 2024-09-12 PROCEDURE — 99213 OFFICE O/P EST LOW 20 MIN: CPT | Performed by: OBSTETRICS & GYNECOLOGY

## 2025-03-12 ENCOUNTER — PATIENT MESSAGE (OUTPATIENT)
Dept: OBGYN | Age: 35
End: 2025-03-12

## 2025-03-12 DIAGNOSIS — N93.9 ABNORMAL UTERINE BLEEDING (AUB): Primary | ICD-10-CM

## 2025-03-14 DIAGNOSIS — N93.9 ABNORMAL UTERINE BLEEDING (AUB): ICD-10-CM

## 2025-03-14 LAB — HCG QUANTITATIVE: <0.1 MIU/ML (ref 0–7)

## 2025-03-19 SDOH — ECONOMIC STABILITY: INCOME INSECURITY: IN THE LAST 12 MONTHS, WAS THERE A TIME WHEN YOU WERE NOT ABLE TO PAY THE MORTGAGE OR RENT ON TIME?: NO

## 2025-03-19 SDOH — ECONOMIC STABILITY: FOOD INSECURITY: WITHIN THE PAST 12 MONTHS, YOU WORRIED THAT YOUR FOOD WOULD RUN OUT BEFORE YOU GOT MONEY TO BUY MORE.: NEVER TRUE

## 2025-03-19 SDOH — ECONOMIC STABILITY: TRANSPORTATION INSECURITY
IN THE PAST 12 MONTHS, HAS THE LACK OF TRANSPORTATION KEPT YOU FROM MEDICAL APPOINTMENTS OR FROM GETTING MEDICATIONS?: NO

## 2025-03-19 SDOH — ECONOMIC STABILITY: FOOD INSECURITY: WITHIN THE PAST 12 MONTHS, THE FOOD YOU BOUGHT JUST DIDN'T LAST AND YOU DIDN'T HAVE MONEY TO GET MORE.: NEVER TRUE

## 2025-03-19 ASSESSMENT — PATIENT HEALTH QUESTIONNAIRE - PHQ9
2. FEELING DOWN, DEPRESSED OR HOPELESS: NOT AT ALL
SUM OF ALL RESPONSES TO PHQ9 QUESTIONS 1 & 2: 0
1. LITTLE INTEREST OR PLEASURE IN DOING THINGS: NOT AT ALL
2. FEELING DOWN, DEPRESSED OR HOPELESS: NOT AT ALL
SUM OF ALL RESPONSES TO PHQ QUESTIONS 1-9: 0
SUM OF ALL RESPONSES TO PHQ QUESTIONS 1-9: 0
1. LITTLE INTEREST OR PLEASURE IN DOING THINGS: NOT AT ALL
SUM OF ALL RESPONSES TO PHQ QUESTIONS 1-9: 0
SUM OF ALL RESPONSES TO PHQ QUESTIONS 1-9: 0

## 2025-03-20 ENCOUNTER — OFFICE VISIT (OUTPATIENT)
Dept: OBGYN | Age: 35
End: 2025-03-20
Payer: COMMERCIAL

## 2025-03-20 VITALS
WEIGHT: 147 LBS | BODY MASS INDEX: 23.73 KG/M2 | DIASTOLIC BLOOD PRESSURE: 84 MMHG | TEMPERATURE: 97.9 F | OXYGEN SATURATION: 99 % | SYSTOLIC BLOOD PRESSURE: 130 MMHG | HEART RATE: 101 BPM

## 2025-03-20 DIAGNOSIS — N93.9 ABNORMAL UTERINE BLEEDING (AUB): Primary | ICD-10-CM

## 2025-03-20 PROCEDURE — 99213 OFFICE O/P EST LOW 20 MIN: CPT | Performed by: OBSTETRICS & GYNECOLOGY

## 2025-03-20 PROCEDURE — 99212 OFFICE O/P EST SF 10 MIN: CPT | Performed by: OBSTETRICS & GYNECOLOGY

## 2025-03-20 NOTE — PROGRESS NOTES
.  
Here today with complaints of abnormal cycles  Patient states her last cycle was beginning of January.   Discharge instructions have been discussed with the patient. Patient advised to call our office with any questions or concerns.   Voiced understanding.    
total     Standing Status:   Future     Expected Date:   3/20/2025     Expiration Date:   3/20/2026    DHEA-Sulfate     Standing Status:   Future     Expected Date:   3/20/2025     Expiration Date:   3/20/2026    HCG Qualitative, Serum     Standing Status:   Future     Expected Date:   3/20/2025     Expiration Date:   3/20/2026    Estradiol     Standing Status:   Future     Expected Date:   3/20/2025     Expiration Date:   3/20/2026    Testosterone     Standing Status:   Future     Expected Date:   3/20/2025     Expiration Date:   3/20/2026    17-Hydroxyprogesterone     Standing Status:   Future     Expected Date:   3/20/2025     Expiration Date:   3/20/2026         Electronically signed by Guillermina Restrepo DO on 3/20/25

## 2025-04-14 DIAGNOSIS — N93.9 ABNORMAL UTERINE BLEEDING (AUB): ICD-10-CM

## 2025-04-14 LAB — PREGNANCY, SERUM: NEGATIVE

## 2025-04-15 LAB
ESTRADIOL LEVEL: 113 PG/ML
FOLLICLE STIMULATING HORMONE: 6.8 MIU/ML
GLUCOSE BLD-MCNC: 114 MG/DL (ref 74–99)
HCT VFR BLD CALC: 40 % (ref 34–48)
HEMOGLOBIN: 12.6 G/DL (ref 11.5–15.5)
LH: 40 MIU/ML
MCH RBC QN AUTO: 28.2 PG (ref 26–35)
MCHC RBC AUTO-ENTMCNC: 31.5 G/DL (ref 32–34.5)
MCV RBC AUTO: 89.5 FL (ref 80–99.9)
PDW BLD-RTO: 12.5 % (ref 11.5–15)
PLATELET # BLD: 255 K/UL (ref 130–450)
PMV BLD AUTO: 12.1 FL (ref 7–12)
PROLACTIN: 28.8 NG/ML
RBC # BLD: 4.47 M/UL (ref 3.5–5.5)
TESTOSTERONE TOTAL: 33 NG/DL (ref 8–48)
WBC # BLD: 7.4 K/UL (ref 4.5–11.5)

## 2025-04-16 ENCOUNTER — RESULTS FOLLOW-UP (OUTPATIENT)
Dept: OBGYN | Age: 35
End: 2025-04-16

## 2025-04-16 DIAGNOSIS — E22.1 HYPERPROLACTINEMIA: Primary | ICD-10-CM

## 2025-04-16 LAB
DHEAS (DHEA SULFATE): 174 UG/DL (ref 98.8–340)
INSULIN COMMENT: 0
INSULIN REFERENCE RANGE:: NORMAL
INSULIN: 48.4 MU/L
TSH SERPL DL<=0.05 MIU/L-ACNC: 1.58 UIU/ML (ref 0.27–4.2)

## 2025-04-17 ENCOUNTER — HOSPITAL ENCOUNTER (OUTPATIENT)
Age: 35
Discharge: HOME | End: 2025-04-17
Payer: COMMERCIAL

## 2025-04-17 DIAGNOSIS — E22.1 HYPERPROLACTINEMIA: ICD-10-CM

## 2025-04-17 LAB — PROLACTIN SERPL-MCNC: 12.9 NG/ML

## 2025-04-17 PROCEDURE — 36415 COLL VENOUS BLD VENIPUNCTURE: CPT

## 2025-04-17 PROCEDURE — 84146 ASSAY OF PROLACTIN: CPT

## 2025-04-19 LAB — 17 OH PROGESTERONE: 113.79 NG/DL

## 2025-04-22 ENCOUNTER — SCHEDULED TELEPHONE ENCOUNTER (OUTPATIENT)
Dept: OBGYN | Age: 35
End: 2025-04-22
Payer: COMMERCIAL

## 2025-04-22 DIAGNOSIS — N91.2 AMENORRHEA: Primary | ICD-10-CM

## 2025-04-22 PROCEDURE — 98966 PH1 ASSMT&MGMT NQHP 5-10: CPT | Performed by: OBSTETRICS & GYNECOLOGY

## 2025-04-22 RX ORDER — MEDROXYPROGESTERONE ACETATE 10 MG
10 TABLET ORAL DAILY
Qty: 10 TABLET | Refills: 0 | Status: SHIPPED | OUTPATIENT
Start: 2025-04-22 | End: 2025-05-02

## 2025-05-07 ENCOUNTER — TELEPHONE (OUTPATIENT)
Dept: OBGYN | Age: 35
End: 2025-05-07

## 2025-05-07 DIAGNOSIS — N91.2 AMENORRHEA: ICD-10-CM

## 2025-05-07 LAB — HCG QUANTITATIVE: 9817 MIU/ML (ref 0–7)

## 2025-05-07 NOTE — TELEPHONE ENCOUNTER
Patient called back in. Before going to  medication she did a pregnancy test and it came back positive. Wants to know if she can have some blood work called in.

## 2025-05-08 ENCOUNTER — TELEPHONE (OUTPATIENT)
Dept: OBGYN | Age: 35
End: 2025-05-08

## 2025-05-08 ENCOUNTER — RESULTS FOLLOW-UP (OUTPATIENT)
Dept: OBGYN | Age: 35
End: 2025-05-08

## 2025-05-08 ENCOUNTER — ROUTINE PRENATAL (OUTPATIENT)
Dept: OBGYN CLINIC | Age: 35
End: 2025-05-08
Payer: COMMERCIAL

## 2025-05-08 ENCOUNTER — ANCILLARY PROCEDURE (OUTPATIENT)
Dept: OBGYN CLINIC | Age: 35
End: 2025-05-08
Payer: COMMERCIAL

## 2025-05-08 VITALS
TEMPERATURE: 98.8 F | RESPIRATION RATE: 20 BRPM | OXYGEN SATURATION: 98 % | DIASTOLIC BLOOD PRESSURE: 83 MMHG | HEART RATE: 95 BPM | SYSTOLIC BLOOD PRESSURE: 119 MMHG | WEIGHT: 146 LBS | BODY MASS INDEX: 23.57 KG/M2

## 2025-05-08 DIAGNOSIS — O36.80X0 ENCOUNTER TO DETERMINE FETAL VIABILITY OF PREGNANCY, SINGLE OR UNSPECIFIED FETUS: ICD-10-CM

## 2025-05-08 DIAGNOSIS — Z36.9 ENCOUNTER FOR FETAL ULTRASOUND: Primary | ICD-10-CM

## 2025-05-08 DIAGNOSIS — N91.2 AMENORRHEA: Primary | ICD-10-CM

## 2025-05-08 DIAGNOSIS — O24.414 INSULIN CONTROLLED GESTATIONAL DIABETES MELLITUS (GDM) IN THIRD TRIMESTER: ICD-10-CM

## 2025-05-08 PROCEDURE — 76817 TRANSVAGINAL US OBSTETRIC: CPT | Performed by: OBSTETRICS & GYNECOLOGY

## 2025-05-08 PROCEDURE — 99213 OFFICE O/P EST LOW 20 MIN: CPT | Performed by: OBSTETRICS & GYNECOLOGY

## 2025-05-08 NOTE — PROGRESS NOTES
Alyse Art presents to office today for US dating and viability  LMP-01/08/2025 approximate  Patient denies bleeding, LOF, or contractions  No fetal movement at this time    No urine at this time

## 2025-05-09 DIAGNOSIS — N91.2 AMENORRHEA: ICD-10-CM

## 2025-05-09 LAB — HCG QUANTITATIVE: ABNORMAL MIU/ML (ref 0–7)

## 2025-05-11 PROBLEM — O36.80X0 ENCOUNTER TO DETERMINE FETAL VIABILITY OF PREGNANCY: Status: ACTIVE | Noted: 2025-05-11

## 2025-05-12 ENCOUNTER — RESULTS FOLLOW-UP (OUTPATIENT)
Dept: OBGYN | Age: 35
End: 2025-05-12

## 2025-05-19 ENCOUNTER — ROUTINE PRENATAL (OUTPATIENT)
Age: 35
End: 2025-05-19
Payer: COMMERCIAL

## 2025-05-19 VITALS
OXYGEN SATURATION: 98 % | WEIGHT: 145.2 LBS | DIASTOLIC BLOOD PRESSURE: 80 MMHG | TEMPERATURE: 98.1 F | SYSTOLIC BLOOD PRESSURE: 121 MMHG | HEIGHT: 66 IN | RESPIRATION RATE: 16 BRPM | BODY MASS INDEX: 23.33 KG/M2 | HEART RATE: 93 BPM

## 2025-05-19 DIAGNOSIS — O41.8X10 SUBCHORIONIC HEMATOMA IN FIRST TRIMESTER, SINGLE OR UNSPECIFIED FETUS: ICD-10-CM

## 2025-05-19 DIAGNOSIS — O46.8X1 SUBCHORIONIC HEMATOMA IN FIRST TRIMESTER, SINGLE OR UNSPECIFIED FETUS: ICD-10-CM

## 2025-05-19 DIAGNOSIS — Z34.91 PREGNANCY WITH UNCERTAIN DATES IN FIRST TRIMESTER: Primary | ICD-10-CM

## 2025-05-19 DIAGNOSIS — O36.80X0 ENCOUNTER TO DETERMINE FETAL VIABILITY OF PREGNANCY, SINGLE OR UNSPECIFIED FETUS: ICD-10-CM

## 2025-05-19 LAB
GLUCOSE URINE, POC: NEGATIVE MG/DL
PROTEIN UA: NEGATIVE

## 2025-05-19 PROCEDURE — 81002 URINALYSIS NONAUTO W/O SCOPE: CPT | Performed by: OBSTETRICS & GYNECOLOGY

## 2025-05-19 PROCEDURE — 99999 PR OFFICE/OUTPT VISIT,PROCEDURE ONLY: CPT | Performed by: OBSTETRICS & GYNECOLOGY

## 2025-05-19 NOTE — PROGRESS NOTES
Alyse Art is here today for repeat viability and dates  States no bleeding, LOF or contractions.

## 2025-05-19 NOTE — PATIENT INSTRUCTIONS
Patient Education        Weeks 18 to 22 of Your Pregnancy: Care Instructions  At this stage you may find that your nausea and fatigue are gone. You may feel better overall and have more energy. But you might now also have some new discomforts, like sleep problems or leg cramps.    You may start to feel your baby move. These movements can feel like butterflies or bubbles.   Babies at this stage can now suck their thumbs.     Get some exercise every day.  And avoid caffeine late in the day.     Take a warm shower or bath before bed.  Try relaxation exercises to calm your mind and body.     Use extra pillows.  They can help you get comfortable.     Don't use sleeping pills or alcohol.  They could harm your baby.     For leg cramps, stretch and apply heat.  A warm bath, leg warmers, a heating pad, or a hot water bottle can help with muscle aches.   Stretches for leg cramps    Straighten your leg and bend your foot (flex your ankle) slowly upward, toward your knee. Bend your toes up and down.   Stand on a flat surface. Stretch your toes upward. For balance, hold on to the wall or something stable. If it feels okay, take small steps walking on your heels.   Follow-up care is a key part of your treatment and safety. Be sure to make and go to all appointments, and call your doctor if you are having problems. It's also a good idea to know your test results and keep a list of the medicines you take.  Where can you learn more?  Go to https://www.marinanow.net/patientEd and enter W603 to learn more about \"Weeks 18 to 22 of Your Pregnancy: Care Instructions.\"  Current as of: April 30, 2024  Content Version: 14.4  © 1039-9748 [employer].   Care instructions adapted under license by [employer]. If you have questions about a medical condition or this instruction, always ask your healthcare professional. [employer] disclaims any warranty or liability for your use of this information.

## 2025-05-21 PROBLEM — O46.8X1 SUBCHORIONIC HEMATOMA IN FIRST TRIMESTER: Status: ACTIVE | Noted: 2025-05-21

## 2025-05-21 PROBLEM — O41.8X10 SUBCHORIONIC HEMATOMA IN FIRST TRIMESTER: Status: ACTIVE | Noted: 2025-05-21

## 2025-05-22 NOTE — PROGRESS NOTES
2025    Guillermina Restrepo, DO  [address]""  [address] [place] [address]     RE:  MARGE ART  : 1990   AGE: 35 y.o.      Dear Dr. Restrepo:    Your patient, Marge Art was scheduled for a pregnancy ultrasound assessment.  A detailed report is included under the imaging tab in the EMR from today's date.    A first trimester nuchal translucency assessment is recommended between 11 and 14 weeks gestational age.  A comprehensive ultrasound evaluation of the fetal anatomy and growth should be performed at approximately 20 weeks gestational age unless there is a clinical indication to perform the examination prior to that time.  A third trimester ultrasound evaluation at 28 to 32 weeks gestational age to assess fetal anatomy and growth should be performed unless there is a clinical indication to perform the evaluation prior to that time.  Further evaluation and management will be dependent on the patient's clinical presentation and the results of her testing.    If you have any questions regarding her management, please contact me at your convenience and thank you for allowing me to participate in her care    Sincerely,        Bernard Pierson MD, MS, FACOG, FACS, RDCS, RDMS, RVT  Director Maternal-Fetal Medicine  [facility]  [phone]

## 2025-05-27 ENCOUNTER — TELEPHONE (OUTPATIENT)
Dept: OBGYN | Age: 35
End: 2025-05-27

## 2025-05-27 ENCOUNTER — TELEPHONE (OUTPATIENT)
Dept: ADMINISTRATIVE | Age: 35
End: 2025-05-27

## 2025-05-27 NOTE — TELEPHONE ENCOUNTER
Called patient to schedule for June 19th, she's unable to come that day. She's going to call [facility] to see if there's any sooner appt's with Dr. Restrepo in that location.

## 2025-05-27 NOTE — TELEPHONE ENCOUNTER
LMP 1/8/25 Patient states has irregular cycles states approx 8 wks. Patient requesting to be schedule for initial OB.  5/19/25 @ [facility]. Please advise patient [phone].

## 2025-05-27 NOTE — TELEPHONE ENCOUNTER
Patient is currently pregnant - estimated about 8 weeks along. She has had a dating US and blood work. She is a current patient of Dr. Ryder. She is wanting to know when she can get in to see her or someone else since she has had the testing done. She does have a history of c-sections. She will be on vacation 6/19-6/24.    Please advise on timing.

## 2025-05-30 ENCOUNTER — TELEPHONE (OUTPATIENT)
Age: 35
End: 2025-05-30

## 2025-05-30 NOTE — TELEPHONE ENCOUNTER
Patient called in to get an ob appointment she was seen for dating with maternal fetal medicine and was never scheduled for women's and she wanted  I gave her July 31st at 11am and she stated  wanted her seen sooner I don't see any availability.

## 2025-06-16 ENCOUNTER — INITIAL PRENATAL (OUTPATIENT)
Age: 35
End: 2025-06-16
Payer: COMMERCIAL

## 2025-06-16 VITALS
HEART RATE: 114 BPM | DIASTOLIC BLOOD PRESSURE: 86 MMHG | SYSTOLIC BLOOD PRESSURE: 127 MMHG | WEIGHT: 144 LBS | BODY MASS INDEX: 23.24 KG/M2

## 2025-06-16 DIAGNOSIS — Z86.32 HISTORY OF GESTATIONAL DIABETES: ICD-10-CM

## 2025-06-16 DIAGNOSIS — Z83.3 FAMILY HISTORY OF DIABETES MELLITUS: ICD-10-CM

## 2025-06-16 DIAGNOSIS — O46.8X1 SUBCHORIONIC HEMATOMA IN FIRST TRIMESTER, SINGLE OR UNSPECIFIED FETUS: Primary | ICD-10-CM

## 2025-06-16 DIAGNOSIS — O41.8X10 SUBCHORIONIC HEMATOMA IN FIRST TRIMESTER, SINGLE OR UNSPECIFIED FETUS: Primary | ICD-10-CM

## 2025-06-16 DIAGNOSIS — Z98.891 PREVIOUS CESAREAN SECTION: ICD-10-CM

## 2025-06-16 DIAGNOSIS — Z34.81 ENCOUNTER FOR SUPERVISION OF OTHER NORMAL PREGNANCY, FIRST TRIMESTER: ICD-10-CM

## 2025-06-16 DIAGNOSIS — Z3A.10 10 WEEKS GESTATION OF PREGNANCY: ICD-10-CM

## 2025-06-16 LAB
ABO/RH: NORMAL
ANTIBODY SCREEN: NEGATIVE
BLOOD BANK SAMPLE EXPIRATION: NORMAL
GLUCOSE URINE, POC: NEGATIVE MG/DL
HCT VFR BLD CALC: 34.4 % (ref 34–48)
HEMOGLOBIN: 11.3 G/DL (ref 11.5–15.5)
HEP B S AGB SURF AG: NONREACTIVE
HEPATITIS C ANTIBODY: NONREACTIVE
HIV AG/AB: NONREACTIVE
MCH RBC QN AUTO: 28.4 PG (ref 26–35)
MCHC RBC AUTO-ENTMCNC: 32.8 G/DL (ref 32–34.5)
MCV RBC AUTO: 86.4 FL (ref 80–99.9)
PDW BLD-RTO: 12.4 % (ref 11.5–15)
PLATELET # BLD: 326 K/UL (ref 130–450)
PMV BLD AUTO: 11.4 FL (ref 7–12)
PROTEIN UA: NEGATIVE
RBC # BLD: 3.98 M/UL (ref 3.5–5.5)
TSH SERPL DL<=0.05 MIU/L-ACNC: 0.63 UIU/ML (ref 0.27–4.2)
VITAMIN D 25-HYDROXY: 24.9 NG/ML (ref 30–100)
WBC # BLD: 11 K/UL (ref 4.5–11.5)

## 2025-06-16 PROCEDURE — 81002 URINALYSIS NONAUTO W/O SCOPE: CPT

## 2025-06-16 PROCEDURE — 0500F INITIAL PRENATAL CARE VISIT: CPT

## 2025-06-16 NOTE — PROGRESS NOTES
HISTORY OF PRESENT ILLNESS:  Alyse Art is a 35 y.o. female , Patient's last menstrual period was 2025 (approximate).,  at 10w6d. Prior pregnancy w/ GDM requiring insulin. Previous  for failure to progress in second stage, baby 4ex72up.    LPAP: 2023  HSV: Denies  STDs: Denies    Pregnancy complicated by:   Patient Active Problem List   Diagnosis Code    Encounter to determine fetal viability of pregnancy O36.80X0    Subchorionic hematoma in first trimester O41.8X10, O46.8X1    Previous  section Z98.891    History of gestational diabetes Z86.32     Genetic testing: Desires genetic testing, denies family hx of chromosomal abnormalities    PAST OB HISTORY  OB History          2    Para   1    Term   1            AB        Living   1         SAB        IAB        Ectopic        Molar        Multiple   0    Live Births   1                Past Medical History:          Diagnosis Date    Anxiety     Arrest of descent, delivered, current hospitalization 2024    Excessive fetal growth affecting management of pregnancy in third trimester 2024    Fibroadenoma        Past Surgical History:          Procedure Laterality Date    BREAST BIOPSY       SECTION N/A 3/30/2024     SECTION performed by Shant Bell MD at [facility] L&D OR       Social History:    TOBACCO:   reports that she has never smoked. She has never used smokeless tobacco.  ETOH:   reports no history of alcohol use.  DRUGS:   reports no history of drug use.  Family History:       Problem Relation Age of Onset    Diabetes Mother     Diabetes Father     Pervasive Developmental Disorders  Maternal Aunt         developmental disability    Other Maternal Cousin         developmental disability       Medications Prior to Admission:  Not in a hospital admission.    Allergies:  Patient has no known allergies.      REVIEW OF SYSTEMS:          CONSTITUTIONAL :      No fever, no chills

## 2025-06-16 NOTE — PROGRESS NOTES
Patient alert and pleasant with no complaints.  Patient of Dr. Restrepo. Here today for her initial prenatal visit.  Urine for glucose and protein obtained   Discharge instructions have been discussed with the patient. Patient advised to call our office with any questions or concerns.   Voiced understanding.

## 2025-06-17 LAB
RPR: NONREACTIVE
RUBV IGG SER QL: NORMAL IU/ML
VZV IGG SER QL IA: ABNORMAL

## 2025-06-18 ENCOUNTER — RESULTS FOLLOW-UP (OUTPATIENT)
Age: 35
End: 2025-06-18

## 2025-06-18 RX ORDER — ERGOCALCIFEROL 1.25 MG/1
50000 CAPSULE, LIQUID FILLED ORAL WEEKLY
Qty: 12 CAPSULE | Refills: 0 | Status: SHIPPED | OUTPATIENT
Start: 2025-06-18

## 2025-06-23 LAB
Lab: NORMAL
NTRA FETAL FRACTION: NORMAL
NTRA GENDER OF FETUS: NORMAL
NTRA MONOSOMY X AGE-BASED RISK TEXT: NORMAL
NTRA MONOSOMY X RESULT TEXT: NORMAL
NTRA MONOSOMY X RISK SCORE TEXT: NORMAL
NTRA TRIPLOIDY RESULT TEXT: NORMAL
NTRA TRISOMY 13 AGE-BASED RISK TEXT: NORMAL
NTRA TRISOMY 13 RESULT TEXT: NORMAL
NTRA TRISOMY 13 RISK SCORE TEXT: NORMAL
NTRA TRISOMY 18 AGE-BASED RISK TEXT: NORMAL
NTRA TRISOMY 18 RESULT TEXT: NORMAL
NTRA TRISOMY 18 RISK SCORE TEXT: NORMAL
NTRA TRISOMY 21 AGE-BASED RISK TEXT: NORMAL
NTRA TRISOMY 21 RESULT TEXT: NORMAL
NTRA TRISOMY 21 RISK SCORE TEXT: NORMAL

## 2025-06-25 ENCOUNTER — ROUTINE PRENATAL (OUTPATIENT)
Age: 35
End: 2025-06-25

## 2025-06-25 VITALS
OXYGEN SATURATION: 100 % | SYSTOLIC BLOOD PRESSURE: 113 MMHG | BODY MASS INDEX: 23.6 KG/M2 | WEIGHT: 146.2 LBS | TEMPERATURE: 97.8 F | HEART RATE: 90 BPM | DIASTOLIC BLOOD PRESSURE: 74 MMHG | RESPIRATION RATE: 14 BRPM

## 2025-06-25 DIAGNOSIS — Z98.891 PREVIOUS CESAREAN SECTION: ICD-10-CM

## 2025-06-25 DIAGNOSIS — Z36.9 FIRST TRIMESTER SCREENING: Primary | ICD-10-CM

## 2025-06-25 DIAGNOSIS — O41.8X10 SUBCHORIONIC HEMATOMA IN FIRST TRIMESTER, SINGLE OR UNSPECIFIED FETUS: ICD-10-CM

## 2025-06-25 DIAGNOSIS — O46.8X1 SUBCHORIONIC HEMATOMA IN FIRST TRIMESTER, SINGLE OR UNSPECIFIED FETUS: ICD-10-CM

## 2025-06-25 DIAGNOSIS — Z86.32 HISTORY OF GESTATIONAL DIABETES: ICD-10-CM

## 2025-06-25 NOTE — PATIENT INSTRUCTIONS
your doctor if you have certain symptoms.  These are general suggestions. Your doctor may give you some more information about when to call.  When to call your doctor or midwife (up to 20 weeks)  Call 911  anytime you think you may need emergency care. For example, call if:  You have severe vaginal bleeding. You have soaked through one or more pads in an hour, and the bleeding is not slowing down.  You have chest pain, are short of breath, or cough up blood.  You have sudden, severe pain in your belly that does not go away.  You passed out (lost consciousness).  Call your doctor or midwife now or seek immediate medical care if:  You have a fever.  You have vaginal bleeding.  You are dizzy or lightheaded, or you feel like you may faint.  You have signs of a blood clot in your leg (called a deep vein thrombosis), such as:  Pain in the calf, back of the knee, thigh, or groin.  Swelling in your leg or groin.  A color change on the leg or groin. The skin may be reddish or purplish.  You have symptoms of a urinary tract infection. These may include:  Pain or burning when you urinate.  A frequent need to urinate without being able to pass much urine.  Pain in your low back (below the rib cage and above the waist).  Blood in your urine.  You have belly pain.  You think you are having contractions.  Watch closely for changes in your health, and be sure to contact your doctor or midwife if:  You have vaginal discharge that smells bad.  You feel sad, anxious, or hopeless for more than a few days.  You have other concerns about your pregnancy.  Follow-up care is a key part of your treatment and safety. Be sure to make and go to all appointments, and call your doctor if you are having problems. It's also a good idea to know your test results and keep a list of the medicines you take.  Where can you learn more?  Go to https://www.healthwise.net/patientEd and enter G674 to learn more about \"Learning About When to Call Your Doctor

## 2025-06-29 PROBLEM — Z36.9 FIRST TRIMESTER SCREENING: Status: ACTIVE | Noted: 2025-06-29

## 2025-07-10 DIAGNOSIS — Z98.891 PREVIOUS CESAREAN SECTION: ICD-10-CM

## 2025-07-10 DIAGNOSIS — Z83.3 FAMILY HISTORY OF DIABETES MELLITUS: ICD-10-CM

## 2025-07-10 DIAGNOSIS — Z86.32 HISTORY OF GESTATIONAL DIABETES: ICD-10-CM

## 2025-07-10 LAB
AMPHETAMINE SCREEN URINE: NEGATIVE
BARBITURATE SCREEN URINE: NEGATIVE
BENZODIAZEPINE SCREEN, URINE: NEGATIVE
BUPRENORPHINE URINE: NEGATIVE
CANNABINOID SCREEN URINE: NEGATIVE
COCAINE METABOLITE, URINE: NEGATIVE
FENTANYL URINE: NEGATIVE
METHADONE SCREEN, URINE: NEGATIVE
OPIATES, URINE: NEGATIVE
OXYCODONE SCREEN URINE: NEGATIVE
PCP,URINE: NEGATIVE
TEST INFORMATION: NORMAL

## 2025-07-11 LAB
AMOUNT GLUCOSE GIVEN: 0 G
COLLECT TIME, 1HR GLUCOSE: 927
GLUCOSE TOLERANCE TEST 1 HOUR: 175 MG/DL

## 2025-07-12 LAB
CULTURE: NORMAL
CULTURE: NORMAL
SPECIMEN DESCRIPTION: NORMAL

## 2025-07-15 DIAGNOSIS — R73.09 ELEVATED GLUCOSE TOLERANCE TEST: Primary | ICD-10-CM

## 2025-07-31 ENCOUNTER — INITIAL PRENATAL (OUTPATIENT)
Age: 35
End: 2025-07-31
Payer: COMMERCIAL

## 2025-07-31 VITALS
DIASTOLIC BLOOD PRESSURE: 84 MMHG | WEIGHT: 153 LBS | SYSTOLIC BLOOD PRESSURE: 121 MMHG | BODY MASS INDEX: 24.69 KG/M2 | OXYGEN SATURATION: 98 % | HEART RATE: 111 BPM

## 2025-07-31 DIAGNOSIS — Z34.82 ENCOUNTER FOR SUPERVISION OF OTHER NORMAL PREGNANCY IN SECOND TRIMESTER: Primary | ICD-10-CM

## 2025-07-31 DIAGNOSIS — Z3A.17 17 WEEKS GESTATION OF PREGNANCY: ICD-10-CM

## 2025-07-31 DIAGNOSIS — O24.419 GESTATIONAL DIABETES MELLITUS (GDM) IN SECOND TRIMESTER, GESTATIONAL DIABETES METHOD OF CONTROL UNSPECIFIED: ICD-10-CM

## 2025-07-31 LAB
GLUCOSE URINE, POC: NEGATIVE MG/DL
PROTEIN UA: POSITIVE

## 2025-07-31 PROCEDURE — 0502F SUBSEQUENT PRENATAL CARE: CPT | Performed by: OBSTETRICS & GYNECOLOGY

## 2025-07-31 PROCEDURE — 81002 URINALYSIS NONAUTO W/O SCOPE: CPT | Performed by: OBSTETRICS & GYNECOLOGY

## 2025-07-31 RX ORDER — AVOBENZONE, HOMOSALATE, OCTISALATE, OCTOCRYLENE 30; 40; 45; 26 MG/ML; MG/ML; MG/ML; MG/ML
1 CREAM TOPICAL 4 TIMES DAILY
Qty: 200 EACH | Refills: 5 | Status: SHIPPED | OUTPATIENT
Start: 2025-07-31

## 2025-07-31 RX ORDER — GLUCOSAMINE HCL/CHONDROITIN SU 500-400 MG
CAPSULE ORAL
Qty: 200 STRIP | Refills: 5 | Status: SHIPPED | OUTPATIENT
Start: 2025-07-31

## 2025-07-31 NOTE — PATIENT INSTRUCTIONS
She is to check sugars fasting and 2 hours after meals. Fasting should be less than 95 and 2 hours after meals should be less than 120.

## 2025-07-31 NOTE — PROGRESS NOTES
Here today for initial prenatal visit.  Fetal heart tones obtained without difficulty.  Urine for glucose and protein obtained   Discharge instructions have been discussed with the patient. Patient advised to call our office with any questions or concerns.   Voiced understanding.

## 2025-07-31 NOTE — PROGRESS NOTES
SUBJECTIVE:   35 y.o.  female here for routine OB appointment.   Varicella nonimmune  Panorama low risk  Failed early 1 hr. 3 hr and HgbA1C ordered. Discussed likely gestational diabetes vs possibly overt diabetes. Does not need to do 3 hour. Will treat as diabetes.  Will start testing glucose. Declines diabetes counseling.parameters discussed.   Anatomy scheduled. Afp ordered.  F/u 2 wks    OB History    Para Term  AB Living   2 1 1   1   SAB IAB Ectopic Molar Multiple Live Births       0 1      # Outcome Date GA Lbr Ab/2nd Weight Sex Type Anes PTL Lv   2 Current            1 Term 24 39w1d / 02:25 3.92 kg (8 lb 10.3 oz) F CS-LTranv Spinal, EPI N MADISON      Complications: Failure to Progress in Second Stage       Past Medical History:   Diagnosis Date    Anxiety     Arrest of descent, delivered, current hospitalization 2024    Excessive fetal growth affecting management of pregnancy in third trimester 2024    Fibroadenoma         Past Surgical History:   Procedure Laterality Date    BREAST BIOPSY       SECTION N/A 3/30/2024     SECTION performed by Shant Bell MD at [facility] L&D OR        Family History   Problem Relation Age of Onset    Diabetes Mother     Diabetes Father     Pervasive Developmental Disorders  Maternal Aunt         developmental disability    Other Maternal Cousin         developmental disability        Social History       Tobacco History       Smoking Status  Never      Smokeless Tobacco Use  Never              Alcohol History       Alcohol Use Status  Never              Drug Use       Drug Use Status  Never              Sexual Activity       Sexually Active  Yes Partners  Male                      Current Outpatient Medications:     blood glucose monitor strips, Test 4 times a day & as needed for symptoms of irregular blood glucose. Dispense sufficient amount for indicated testing frequency plus additional to accommodate PRN testing

## 2025-08-05 DIAGNOSIS — Z34.82 ENCOUNTER FOR SUPERVISION OF OTHER NORMAL PREGNANCY IN SECOND TRIMESTER: ICD-10-CM

## 2025-08-05 DIAGNOSIS — Z3A.17 17 WEEKS GESTATION OF PREGNANCY: ICD-10-CM

## 2025-08-05 DIAGNOSIS — R73.09 ELEVATED GLUCOSE TOLERANCE TEST: ICD-10-CM

## 2025-08-05 LAB — HBA1C MFR BLD: 5.3 % (ref 4–5.6)

## 2025-08-08 LAB
AFP INTERPRETATION: NORMAL
AFP MOM: 0.54
AFP SPECIMEN: NORMAL
AFP: 24 NG/ML
DATE OF BIRTH: NORMAL
DATING METHOD: NORMAL
DETERMINED BY: NORMAL
DIABETIC: NO
DONOR EGG?: NO
DUE DATE: NORMAL
ESTIMATED DUE DATE: NORMAL
FAMILY HISTORY NTD: NO
GESTATIONAL AGE: NORMAL
IN VITRO FERTILIZATION: NORMAL
INSULIN REQ DIABETES: NO
LAST MENSTRUAL PERIOD: 153
MATERNAL AGE AT EDD: 34.7 YR
MATERNAL WEIGHT: 152
MONOCHORIONIC TWINS: NO
NUMBER OF FETUSES: NORMAL
PATIENT WEIGHT UNITS: NORMAL
PATIENT WEIGHT: NORMAL
RACE (MATERNAL): NORMAL
RACE: NORMAL
REPEAT SPECIMEN?: NO
SMOKING: NO
SMOKING: NO
VALPROIC/CARBAMAZEP: NO
ZZ NTE CLEAN UP: HISTORY: NO

## 2025-08-14 ENCOUNTER — SCHEDULED TELEPHONE ENCOUNTER (OUTPATIENT)
Age: 35
End: 2025-08-14

## 2025-08-14 DIAGNOSIS — Z34.82 ENCOUNTER FOR SUPERVISION OF OTHER NORMAL PREGNANCY IN SECOND TRIMESTER: Primary | ICD-10-CM

## 2025-08-14 PROCEDURE — 0502F SUBSEQUENT PRENATAL CARE: CPT | Performed by: OBSTETRICS & GYNECOLOGY

## 2025-08-21 ENCOUNTER — ROUTINE PRENATAL (OUTPATIENT)
Age: 35
End: 2025-08-21

## 2025-08-21 VITALS
DIASTOLIC BLOOD PRESSURE: 72 MMHG | HEART RATE: 95 BPM | OXYGEN SATURATION: 98 % | WEIGHT: 158 LBS | BODY MASS INDEX: 25.5 KG/M2 | SYSTOLIC BLOOD PRESSURE: 109 MMHG | RESPIRATION RATE: 16 BRPM | TEMPERATURE: 98.1 F

## 2025-08-21 DIAGNOSIS — O46.8X1 SUBCHORIONIC HEMATOMA IN FIRST TRIMESTER, SINGLE OR UNSPECIFIED FETUS: ICD-10-CM

## 2025-08-21 DIAGNOSIS — Z36.3 ENCOUNTER FOR ANTENATAL SCREENING FOR MALFORMATION USING ULTRASOUND: ICD-10-CM

## 2025-08-21 DIAGNOSIS — O09.899 SINGLE UMBILICAL ARTERY AFFECTING MANAGEMENT OF MOTHER IN SINGLETON PREGNANCY, ANTEPARTUM: Primary | ICD-10-CM

## 2025-08-21 DIAGNOSIS — Z03.75 SUSPECTED SHORTENING OF CERVIX NOT FOUND: ICD-10-CM

## 2025-08-21 DIAGNOSIS — Z98.891 PREVIOUS CESAREAN SECTION: ICD-10-CM

## 2025-08-21 DIAGNOSIS — Z36.3 ENCOUNTER FOR ROUTINE SCREENING FOR MALFORMATION USING ULTRASONICS: ICD-10-CM

## 2025-08-21 DIAGNOSIS — Z86.32 HISTORY OF GESTATIONAL DIABETES: ICD-10-CM

## 2025-08-21 DIAGNOSIS — O41.8X10 SUBCHORIONIC HEMATOMA IN FIRST TRIMESTER, SINGLE OR UNSPECIFIED FETUS: ICD-10-CM

## 2025-08-21 LAB
GLUCOSE URINE, POC: NORMAL MG/DL
PROTEIN UA: NEGATIVE

## 2025-08-22 PROBLEM — O09.899 SINGLE UMBILICAL ARTERY AFFECTING MANAGEMENT OF MOTHER IN SINGLETON PREGNANCY, ANTEPARTUM: Status: ACTIVE | Noted: 2025-08-22

## (undated) DEVICE — SYRINGE MED 10ML LUERLOCK TIP W/O SFTY DISP

## (undated) DEVICE — 1LYRTR 16FR10ML 100%SILI SNAP: Brand: MEDLINE INDUSTRIES, INC.

## (undated) DEVICE — VACUETTE® TUBE 6 ML Z SERUM CLOT ACTIVATOR 13X100 RED CAP-BLACK RING, NON-RIDGED: Brand: VACUETTE

## (undated) DEVICE — BAG SPECIMEN BIOHAZARD 6IN X 9IN

## (undated) DEVICE — SOLUTION IRRIG 500ML 0.9% SOD CHLO USP POUR PLAS BTL

## (undated) DEVICE — NEPTUNE E-SEP SMOKE EVACUATION PENCIL, COATED, 70MM BLADE, PUSH BUTTON SWITCH: Brand: NEPTUNE E-SEP

## (undated) DEVICE — APPLICATOR MEDICATED 26 CC SOLUTION HI LT ORNG CHLORAPREP

## (undated) DEVICE — 34" SINGLE PATIENT USE HOVERMATT BREATHABLE: Brand: SINGLE PATIENT USE HOVERMATT

## (undated) DEVICE — DOUBLE BASIN SET: Brand: MEDLINE INDUSTRIES, INC.

## (undated) DEVICE — STANDARD HYPODERMIC NEEDLE,ALUMINUM HUB: Brand: MONOJECT

## (undated) DEVICE — CONTAINER SPEC 64OZ POLYPR PATH SNAP LOK CAP W/ LID

## (undated) DEVICE — CONTAINER,SPEC,PNEUM TUBE,3OZ,STRL PATH: Brand: MEDLINE

## (undated) DEVICE — Device: Brand: PORTEX

## (undated) DEVICE — GLOVE SURG SZ 65 THK91MIL LTX FREE SYN POLYISOPRENE

## (undated) DEVICE — 4-PORT MANIFOLD: Brand: NEPTUNE 2

## (undated) DEVICE — CESAREAN BIRTH PACK: Brand: MEDLINE INDUSTRIES, INC.